# Patient Record
Sex: FEMALE | Race: WHITE
[De-identification: names, ages, dates, MRNs, and addresses within clinical notes are randomized per-mention and may not be internally consistent; named-entity substitution may affect disease eponyms.]

---

## 2018-06-18 ENCOUNTER — HOSPITAL ENCOUNTER (INPATIENT)
Dept: HOSPITAL 92 - 2NO | Age: 83
LOS: 10 days | Discharge: HOME HEALTH | DRG: 871 | End: 2018-06-28
Attending: INTERNAL MEDICINE | Admitting: INTERNAL MEDICINE
Payer: MEDICARE

## 2018-06-18 VITALS — BODY MASS INDEX: 26.1 KG/M2

## 2018-06-18 DIAGNOSIS — E87.6: ICD-10-CM

## 2018-06-18 DIAGNOSIS — K51.90: ICD-10-CM

## 2018-06-18 DIAGNOSIS — E83.42: ICD-10-CM

## 2018-06-18 DIAGNOSIS — K57.30: ICD-10-CM

## 2018-06-18 DIAGNOSIS — K64.8: ICD-10-CM

## 2018-06-18 DIAGNOSIS — G47.00: ICD-10-CM

## 2018-06-18 DIAGNOSIS — R57.1: ICD-10-CM

## 2018-06-18 DIAGNOSIS — K64.4: ICD-10-CM

## 2018-06-18 DIAGNOSIS — E78.5: ICD-10-CM

## 2018-06-18 DIAGNOSIS — K55.9: ICD-10-CM

## 2018-06-18 DIAGNOSIS — A41.9: Primary | ICD-10-CM

## 2018-06-18 DIAGNOSIS — Z88.2: ICD-10-CM

## 2018-06-18 DIAGNOSIS — Z88.0: ICD-10-CM

## 2018-06-18 LAB
ALBUMIN SERPL BCG-MCNC: 3.1 G/DL (ref 3.4–4.8)
ALP SERPL-CCNC: 73 U/L (ref 40–150)
ALT SERPL W P-5'-P-CCNC: 12 U/L (ref 8–55)
ANION GAP SERPL CALC-SCNC: 13 MMOL/L (ref 10–20)
AST SERPL-CCNC: 11 U/L (ref 5–34)
BACTERIA UR QL AUTO: (no result) HPF
BILIRUB SERPL-MCNC: 0.7 MG/DL (ref 0.2–1.2)
BUN SERPL-MCNC: 11 MG/DL (ref 9.8–20.1)
CALCIUM SERPL-MCNC: 8.8 MG/DL (ref 7.8–10.44)
CHLORIDE SERPL-SCNC: 99 MMOL/L (ref 98–107)
CO2 SERPL-SCNC: 28 MMOL/L (ref 23–31)
CREAT CL PREDICTED SERPL C-G-VRATE: 67 ML/MIN (ref 70–130)
CRYSTAL-AUWI FLAG: 0.1 (ref 0–15)
GLOBULIN SER CALC-MCNC: 3.2 G/DL (ref 2.4–3.5)
GLUCOSE SERPL-MCNC: 118 MG/DL (ref 83–110)
HEV IGM SER QL: 6.3 (ref 0–7.99)
HGB BLD-MCNC: 15.6 G/DL (ref 12–16)
HYALINE CASTS #/AREA URNS LPF: (no result) LPF
MCH RBC QN AUTO: 34.4 PG (ref 27–31)
MCV RBC AUTO: 102 FL (ref 81–99)
MDIFF COMPLETE?: YES
PATHC CAST-AUWI FLAG: 0.87 (ref 0–2.49)
PLATELET # BLD AUTO: 279 THOU/UL (ref 130–400)
PLATELET BLD QL SMEAR: (no result)
POTASSIUM SERPL-SCNC: 2.6 MMOL/L (ref 3.5–5.1)
RBC # BLD AUTO: 4.54 MILL/UL (ref 4.2–5.4)
RBC UR QL AUTO: (no result) HPF (ref 0–3)
SODIUM SERPL-SCNC: 137 MMOL/L (ref 136–145)
SP GR UR STRIP: 1.02 (ref 1–1.04)
SPERM-AUWI FLAG: 0 (ref 0–9.9)
WBC # BLD AUTO: 16.4 THOU/UL (ref 4.8–10.8)
WBC UR QL AUTO: (no result) HPF (ref 0–3)
YEAST-AUWI FLAG: 0 (ref 0–25)

## 2018-06-18 PROCEDURE — 85018 HEMOGLOBIN: CPT

## 2018-06-18 PROCEDURE — 87899 AGENT NOS ASSAY W/OPTIC: CPT

## 2018-06-18 PROCEDURE — 83630 LACTOFERRIN FECAL (QUAL): CPT

## 2018-06-18 PROCEDURE — 86706 HEP B SURFACE ANTIBODY: CPT

## 2018-06-18 PROCEDURE — 86704 HEP B CORE ANTIBODY TOTAL: CPT

## 2018-06-18 PROCEDURE — C9113 INJ PANTOPRAZOLE SODIUM, VIA: HCPCS

## 2018-06-18 PROCEDURE — 80048 BASIC METABOLIC PNL TOTAL CA: CPT

## 2018-06-18 PROCEDURE — 85014 HEMATOCRIT: CPT

## 2018-06-18 PROCEDURE — 80053 COMPREHEN METABOLIC PANEL: CPT

## 2018-06-18 PROCEDURE — 83735 ASSAY OF MAGNESIUM: CPT

## 2018-06-18 PROCEDURE — 36415 COLL VENOUS BLD VENIPUNCTURE: CPT

## 2018-06-18 PROCEDURE — 87329 GIARDIA AG IA: CPT

## 2018-06-18 PROCEDURE — 87045 FECES CULTURE AEROBIC BACT: CPT

## 2018-06-18 PROCEDURE — 81015 MICROSCOPIC EXAM OF URINE: CPT

## 2018-06-18 PROCEDURE — 87046 STOOL CULTR AEROBIC BACT EA: CPT

## 2018-06-18 PROCEDURE — 87389 HIV-1 AG W/HIV-1&-2 AB AG IA: CPT

## 2018-06-18 PROCEDURE — 81001 URINALYSIS AUTO W/SCOPE: CPT

## 2018-06-18 PROCEDURE — 87081 CULTURE SCREEN ONLY: CPT

## 2018-06-18 PROCEDURE — 87324 CLOSTRIDIUM AG IA: CPT

## 2018-06-18 PROCEDURE — 86803 HEPATITIS C AB TEST: CPT

## 2018-06-18 PROCEDURE — 82274 ASSAY TEST FOR BLOOD FECAL: CPT

## 2018-06-18 PROCEDURE — 86709 HEPATITIS A IGM ANTIBODY: CPT

## 2018-06-18 PROCEDURE — 87449 NOS EACH ORGANISM AG IA: CPT

## 2018-06-18 PROCEDURE — 88305 TISSUE EXAM BY PATHOLOGIST: CPT

## 2018-06-18 PROCEDURE — 87340 HEPATITIS B SURFACE AG IA: CPT

## 2018-06-18 PROCEDURE — 85025 COMPLETE CBC W/AUTO DIFF WBC: CPT

## 2018-06-18 PROCEDURE — 86480 TB TEST CELL IMMUN MEASURE: CPT

## 2018-06-18 PROCEDURE — 81003 URINALYSIS AUTO W/O SCOPE: CPT

## 2018-06-18 PROCEDURE — A4216 STERILE WATER/SALINE, 10 ML: HCPCS

## 2018-06-18 PROCEDURE — 87328 CRYPTOSPORIDIUM AG IA: CPT

## 2018-06-18 RX ADMIN — MEROPENEM AND SODIUM CHLORIDE SCH MLS: 1 INJECTION, SOLUTION INTRAVENOUS at 21:07

## 2018-06-18 RX ADMIN — METRONIDAZOLE SCH MLS: 500 INJECTION, SOLUTION INTRAVENOUS at 21:08

## 2018-06-18 RX ADMIN — Medication SCH TAB: at 17:49

## 2018-06-18 RX ADMIN — Medication SCH ML: at 21:07

## 2018-06-18 NOTE — HP
DATE OF ADMISSION:  06/18/2018

 

PRIMARY CARE PHYSICIAN:  Dr. Anand Adkins.

 

CHIEF COMPLAINT:  Diarrhea with blood in the stools.

 

HISTORY OF PRESENT ILLNESS:  Ms. 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
story of microscopic colitis, usually under control, who presented as a direct admit from primary [facility]
e physician's office today with the above-mentioned complaints.  History is mainly obtained by the pa
tere herself and supplemented by her  present in the room.

 

Ms. Garcia reports that she was diagnosed with microscopic colitis about 20 years ago and has had 
a colonoscopy about 10 years ago.  She has followed up with a gastroenterologist in [facility].  She
 has been stable without any significant symptoms on budesonide 3 tablets every morning.  However, ab
out 1 week ago, she started to have significant amount of diarrhea with incontinence.  She was going 
multiple times of the day and it was difficult for her to control it.  It was associated with some cr
ampy abdominal pain, mainly in the lower abdomen.  She did not have any nausea and vomiting.  She did
 report feeling feverish and her highest temperature was 100.2.  It was associated with some chills. 
 She denies any nausea or vomiting.  Today morning, she actually had passed a significant amount of b
lood in place of stools.  She has noticed a small amount of blood in the stools for the last few days
, but it was only minor on wiping.  This morning, she reports that "only blood came out."  She presen
lio to her primary care physician's office where she was found to have low blood pressure.  Reportedl
y, systolic was in the 90s.  She was sent to the hospital as a direct admit for further evaluation an
d resuscitation.

 

At the time of my examination, the patient is hemodynamically stable, awake, alert, oriented x3 and i
s nontoxic and is symptom free.

 

She was treated with metronidazole as an outpatient for the last 4 days without benefit.

 

PAST MEDICAL HISTORY:  Microscopic colitis.

 

PAST SURGICAL HISTORY:

1.  Tonsillectomy.

2.  Ovarian surgery and appendectomy.

3.  Mastectomy 1971.

4.  Cholecystectomy in 1988.

5.  Left knee surgery.

6.  Cataract surgery.

7.  Left total knee replacement in 2014.

 

SOCIAL HISTORY:  She is  and lives with her .  No history of drug, tobacco or alcohol a
buse.

 

CURRENT MEDICATIONS:  Include aspirin 81 mg every night, melatonin 10 mg at bedtime, multivitamin ji
ly, Probiotic daily, vitamin C daily, omeprazole 20 mg daily, triazolam 2 tablets at bedtime, budeson
sharron 3 tablets every morning, Lipitor 1 tablet at bedtime.

 

ALLERGIES:  Multiple allergies listed as below, CLINDAMYCIN, LEVOFLOXACIN, PENICILLIN, POLYETHYLENE G
LYCOL and SULFONAMIDES.

 

FAMILY HISTORY:  Significant for heart attack in her father in his late 80s.  Otherwise, she denies a
ny family history of stroke or cancers.

 

REVIEW OF SYSTEMS:  A 12-point review of systems is done and is negative except for those mentioned i
n the history and physical.  Constitutional:  Weight loss or gain, ability to conduct usual activitie
s.  Skin:  Rash, itching.  Eyes:  Double vision, pain.  ENT/Mouth:  Nose bleeding, neck stiffness, pa
in, tenderness.  Cardiovascular:  Palpitations, dyspnea on exertion, orthopnea.  Respiratory:  Shortn
ess of breath, wheezing, cough, hemoptysis, fever or night sweats.  Gastrointestinal:  Poor appetite,
 abdominal pain, heartburn, nausea, vomiting, constipation, or diarrhea.  Genitourinary:  Urgency, fr
equency, dysuria, nocturia.  Musculoskeletal:  Pain, swelling.  Neurologic/Psychiatric:  Anxiety, dep
ression.  Allergy/Immunologic:  Skin rash, bleeding tendency.

 

LABORATORY DATA:  Labs were ordered at the time of presentation and are as follows:  CBC shows WBCs a
t 16.4 with 17% bands, hemoglobin is 15.6, hematocrit 46.5, platelet count of 79,000.  Serum chemistr
ies show glucose of 118.  Potassium is pending at this time, but her liver enzymes and rest of the [facility] chemistries are unremarkable.

 

 

PHYSICAL EXAMINATION:

VITAL SIGNS:  Upon presentation, temperature 99.2, pulse of 117, respirations 16, saturating 93% on r
oom air, blood pressure 141/82.

GENERAL:  No acute distress.  She is awake, alert, oriented x3, appears younger than her stated age, 
in no acute distress, appears nontoxic.

HEENT:  Mucous membranes are slightly dry.  No oropharyngeal exudate or erythema.  Head is normocepha
lic, atraumatic.  Pupils equal and reactive to light and accommodation.  Extraocular movement intact.


NECK:  Supple without any lymphadenopathy, JVD or bruit.

CHEST:  Clear to auscultation without any wheezing, rales or rhonchi.  Rate and rhythm is regular wit
hout any murmur, rubs or gallops.

ABDOMEN:  Soft.  No guarding, no rebound, no rigidity, no hepatosplenomegaly.  She is tender to palpa
tion below the umbilical region in both right and left lower quadrants.

EXTREMITIES:  Free of any cyanosis, clubbing or edema.

SKIN:  Shows some bruising and thin skin.  Otherwise, no rashes.

NEUROLOGIC:  Nonfocal.

PSYCHIATRIC:  She has normal affect

 

IMPRESSION AND PLAN:

1.  Bloody diarrhea, suspected infectious gastroenteritis.  The patient has been stable with regards 
to her symptoms of microscopic colitis for all these years on budesonide.  With the presence of fever
 and leukocytosis, infection is more likely.  At this time, we will start her on empiric IV antibioti
cs and IV fluids and send stool studies for ova and parasite culture, WBCs and C. diff.  We will requ
est consultation with Gastroenterology and continue her budesonide for now.  We will hold her aspirin
 for now.  Her H&H is stable at this time.  We will start her empirically on proton pump inhibitor IV
 b.i.d.

2.  Hypovolemic shock.  The patient's blood pressure is adequate at this time.  She will be started o
n normal saline and we will monitor closely.

3.  Sepsis.  The patient has elevated WBCs with left shift and subjective fever and the source of inf
ection.  She will be treated with IV fluids and IV antibiotics as above.

4.  History of microscopic colitis.  The patient will be referred to GI after discharge as desired by
 her.  Continue budesonide for now.  Outpatient colonoscopy as suggested by Gastroenterology upon madeline ramirez.

5.  Code status:  FULL CODE.  Discussed with the patient and her .

6.  Deep venous thrombosis and gastrointestinal prophylaxis.  At this time, we will avoid any pharmac
ological deep venous thrombosis prophylaxis given the gastrointestinal bleed.

7.  Add p.r.n. medication orders and supportive care.

 

DISPOSITION:  Ms. Garcia is currently being admitted to the hospital with sepsis and likely infect
ious gastroenteritis.

 

Estimated length of stay at this time is at least 2-3 midnights.  Further management will depend upon
 her clinical course.

## 2018-06-18 NOTE — CON
DATE OF CONSULTATION:  06/18/2018

 

CHIEF COMPLAINT:  Bloody diarrhea.

 

HISTORY OF PRESENT ILLNESS:  Ms. Garcia is an 83-year-old woman who presented with brown liquidy d
iarrhea 5 times a day starting on Friday, 3 days ago.  She felt very dehydrated with this.  She zaldivar
d Dr. Troncoso's office and was given metronidazole empirically.  Today, she had a liquidy bloody diarrh
ea episode and she came in.  She was direct admitted to the hospital for further care.  She has had n
o nausea or vomiting.  She complains of dull aching lower abdominal pain that last around 20 minutes 
at a time several times per day.  She did have loss of continence with the diarrhea initially.  She h
as had no chest pain or shortness of breath.  She does report a fever to 100.7 when the diarrhea firs
t started several days ago, but no ongoing fever.  She did have an episode of diarrhea in December fo
r which she took some metronidazole then.  She has been on budesonide daily for years for microscopic
 colitis.  She states her last colonoscopy was by Dr. Burnett in [place] last fall.

 

PAST MEDICAL HISTORY:  Microscopic colitis, hyperlipidemia, insomnia.

 

PAST SURGICAL HISTORY:  Tonsillectomy, cholecystectomy, ovary surgery, left mastectomy for benign dis
ease, and colonoscopy.

 

FAMILY HISTORY:  Negative for GI malignancy.  Her father had emphysema.

 

SOCIAL HISTORY:  She quit smoking 50 years ago.  She has around 3 ounces of bourbon daily.  No drugs.


 

ALLERGIES:  SULFA, CLINDAMYCIN, PENICILLIN, LEVOFLOXACIN and reported GoLYTELY.

 

REVIEW OF SYSTEMS:  Negative x10 systems reviewed except as stated in history of present illness.

 

PHYSICAL EXAMINATION:

VITAL SIGNS:  Temperature 99.2, pulse 107, blood pressure 141/82.

GENERAL:  She is in no acute distress, alert and oriented x3.

HEENT:  Eyes have no scleral icterus.  Oropharynx is clear, without lesions.

NECK:  No cervical or supraclavicular lymphadenopathy.

LUNGS:  Clear to auscultation bilaterally.

HEART:  Tachycardic S1, S2.

ABDOMEN:  Soft, mild tenderness in lower abdomen without guarding, a little bit more so tender in the
 left lower quadrant.  Bowel sounds are present.

EXTREMITIES:  No lower extremity edema.

 

LABORATORY:  White blood cell count 16.4, hemoglobin 15.6, platelets 279.  Creatinine 0.57.

 

IMPRESSION:

1.  Diarrhea for the last 4 days with bloody diarrhea this morning.  She is on budesonide chronically
 for microscopic colitis.  Infectious colitis would be the most likely source; however, she could hav
e developed an ischemic colitis related to dehydration from infectious colitis.  She has been treated
 empirically with metronidazole for the last 3-4 days and the symptoms have continued, however.  She 
does have 3 ounces of bourbon daily, but she has not had any since starting the metronidazole.

2.  History of microscopic colitis diagnosed 20 years ago.  She has been on budesonide for many years
 9 mg daily.

 

RECOMMENDATIONS:

1.  Obtain the previous colonoscopy report if she states she just had one in [place] last fall.

2.  IV fluids and antibiotics.

3.  Stool studies.

4.  I will hold off endoscopy at this point until we have the previous report.  CT will unlikely ferrer
ge management significantly now.  It might help show a pattern of colitis.  It might be suggestive of
 ischemic colitis; however, the first up release to obtain stool studies and treat her clinically.

5.  Clear liquid diet.

## 2018-06-19 LAB
ANION GAP SERPL CALC-SCNC: 11 MMOL/L (ref 10–20)
BUN SERPL-MCNC: 10 MG/DL (ref 9.8–20.1)
CALCIUM SERPL-MCNC: 7.9 MG/DL (ref 7.8–10.44)
CHLORIDE SERPL-SCNC: 106 MMOL/L (ref 98–107)
CO2 SERPL-SCNC: 24 MMOL/L (ref 23–31)
CREAT CL PREDICTED SERPL C-G-VRATE: 88 ML/MIN (ref 70–130)
GLUCOSE SERPL-MCNC: 84 MG/DL (ref 83–110)
HGB BLD-MCNC: 13.7 G/DL (ref 12–16)
MCH RBC QN AUTO: 34.4 PG (ref 27–31)
MCV RBC AUTO: 102 FL (ref 81–99)
MDIFF COMPLETE?: YES
PLATELET # BLD AUTO: 252 THOU/UL (ref 130–400)
PLATELET BLD QL SMEAR: (no result)
POTASSIUM SERPL-SCNC: 3.6 MMOL/L (ref 3.5–5.1)
RBC # BLD AUTO: 3.97 MILL/UL (ref 4.2–5.4)
SODIUM SERPL-SCNC: 137 MMOL/L (ref 136–145)
WBC # BLD AUTO: 12.7 THOU/UL (ref 4.8–10.8)

## 2018-06-19 RX ADMIN — METRONIDAZOLE SCH MLS: 500 INJECTION, SOLUTION INTRAVENOUS at 14:39

## 2018-06-19 RX ADMIN — Medication SCH ML: at 09:20

## 2018-06-19 RX ADMIN — Medication SCH: at 18:09

## 2018-06-19 RX ADMIN — MEROPENEM AND SODIUM CHLORIDE SCH MLS: 1 INJECTION, SOLUTION INTRAVENOUS at 06:32

## 2018-06-19 RX ADMIN — METRONIDAZOLE SCH MLS: 500 INJECTION, SOLUTION INTRAVENOUS at 20:45

## 2018-06-19 RX ADMIN — MEROPENEM AND SODIUM CHLORIDE SCH MLS: 1 INJECTION, SOLUTION INTRAVENOUS at 21:51

## 2018-06-19 RX ADMIN — Medication SCH ML: at 20:39

## 2018-06-19 RX ADMIN — MEROPENEM AND SODIUM CHLORIDE SCH MLS: 1 INJECTION, SOLUTION INTRAVENOUS at 14:39

## 2018-06-19 RX ADMIN — METRONIDAZOLE SCH MLS: 500 INJECTION, SOLUTION INTRAVENOUS at 06:32

## 2018-06-19 RX ADMIN — Medication SCH TAB: at 18:05

## 2018-06-19 NOTE — PRG
DATE OF SERVICE:  06/19/2018

 

SUBJECTIVE:  Ms. Garcia has left lower quadrant abdominal discomfort today.  She has had 4 bloody 
diarrhea episodes; however, her fifth stool today with liquidy brown and nonbloody.  She has had just
 some water today by mouth.

 

OBJECTIVE:

VITAL SIGNS:  Temperature 98.1, pulse 102, blood pressure 129/68.

GENERAL:  She is in no acute distress.  She is alert and oriented.

LUNGS:  Clear to auscultation bilaterally.

HEART:  Regular rate and rhythm.

ABDOMEN:  Soft, nontender, she has mild tenderness in the left lower quadrant without guarding.  Manuela
l sounds are present.

EXTREMITIES:  No lower extremity edema.

 

LABORATORY DATA:  White blood cell count 12.7, down from 16.4; hemoglobin 13.7, down from 15.6; plate
lets 252.  Creatinine 0.56.

 

IMPRESSION:  Bloody diarrhea.  This is most likely secondary to ischemic colitis versus infectious co
litis.  Stool studies are negative so far except for an elevated lactoferrin.  She now states that he
r last colonoscopy was 5 years ago rather than last December.

 

RECOMMENDATIONS:

1.  I will increase her IV fluids up to 200 mL per hour.

2.  Restart clear liquid diet.

3.  Continue antibiotics.

4.  We will eventually want to perform colonoscopy for her.  If she rapidly improved over the next co
uple of days, so we might delay this for a month; however, she fails to improve, then we can perform 
this as an inpatient.  She was quite dehydrated on presentation.  Her hemoglobin has improved with fl
uids.  Her hemoglobin has come down with fluid resuscitation.

## 2018-06-19 NOTE — PDOC.PN
- Subjective


Encounter Start Date: 06/19/18


Encounter Start Time: 12:20


Subjective: Patient reports some improvement in abdominal pain this morning and 

less 


-: diarrhea. Still some blood in the diarrhea. No fever or other complaints.





- Objective


Resuscitation Status: 


 











Resuscitation Status           FULL:Full Resuscitation














MAR Reviewed: Yes


Vital Signs & Weight: 


 Vital Signs (12 hours)











  Temp Pulse Resp BP Pulse Ox


 


 06/19/18 08:00  99.2 F  101 H  20  133/66  95


 


 06/19/18 04:00  99.0 F  100  17  129/65  94 L








 Weight











Admit Weight                   162 lb


 


Weight                         162 lb














I&O: 


 











 06/18/18 06/19/18 06/20/18





 06:59 06:59 06:59


 


Intake Total  1890 


 


Output Total  700 


 


Balance  1190 











Result Diagrams: 


 06/19/18 04:25





 06/19/18 04:25





Phys Exam





- Physical Examination


Constitutional: NAD


HEENT: moist MMs


Respiratory: no wheezing, no rales, no rhonchi


Cardiovascular: RRR, no significant murmur


Gastrointestinal: soft, positive bowel sounds


Mild TTP entire lower abdomen without guarding


Neurological: non-focal, moves all 4 limbs


Psychiatric: normal affect, A&O x 3





Dx/Plan


(1) Sepsis


Code(s): A41.9 - SEPSIS, UNSPECIFIED ORGANISM   Status: Acute   Comment: WBC 

decreasing, tachycardia improved   





(2) Infectious colitis


Code(s): A09 - INFECTIOUS GASTROENTERITIS AND COLITIS, UNSPECIFIED   Status: 

Acute   Comment: On Meropenem and Flagyl. Stool studies positive for blood and 

elevated lactoferrin. C.diff, Enterohemorrhagic E.coli, Shiga toxin, Campy, O&P 

all negative. Culture with normal stool austen.   





(3) GI bleed


Code(s): K92.2 - GASTROINTESTINAL HEMORRHAGE, UNSPECIFIED   Status: Acute   

Comment: secondary to #1, blood counts stable   





(4) Microscopic colitis


Code(s): K52.839 - MICROSCOPIC COLITIS, UNSPECIFIED   Status: Chronic   Comment

: controlled on Budesonide   





- Plan


cont current plan of care, continue antibiotics, PT/OT, DVT proph w/SCDs





* .








- Discharge Day


Encounter end time: 12:45

## 2018-06-20 LAB — HGB BLD-MCNC: 13.9 G/DL (ref 12–16)

## 2018-06-20 RX ADMIN — MEROPENEM AND SODIUM CHLORIDE SCH MLS: 1 INJECTION, SOLUTION INTRAVENOUS at 05:12

## 2018-06-20 RX ADMIN — METRONIDAZOLE SCH MLS: 500 INJECTION, SOLUTION INTRAVENOUS at 06:20

## 2018-06-20 RX ADMIN — MEROPENEM AND SODIUM CHLORIDE SCH MLS: 1 INJECTION, SOLUTION INTRAVENOUS at 16:47

## 2018-06-20 RX ADMIN — Medication SCH: at 18:30

## 2018-06-20 RX ADMIN — METRONIDAZOLE SCH MLS: 500 INJECTION, SOLUTION INTRAVENOUS at 21:19

## 2018-06-20 RX ADMIN — Medication SCH ML: at 20:18

## 2018-06-20 RX ADMIN — MEROPENEM AND SODIUM CHLORIDE SCH MLS: 1 INJECTION, SOLUTION INTRAVENOUS at 21:18

## 2018-06-20 RX ADMIN — Medication SCH ML: at 09:40

## 2018-06-20 RX ADMIN — METRONIDAZOLE SCH MLS: 500 INJECTION, SOLUTION INTRAVENOUS at 14:43

## 2018-06-20 NOTE — PRG
DATE OF SERVICE:  06/20/2018

 

SUBJECTIVE:  Ms. Garcia states that she was on multiple times last night with bloody diarrhea.  Sh
hudson has had less diarrhea today, but still had some blood when she wipes.  She has improvement in her l
ower abdominal pain.

 

OBJECTIVE

VITAL SIGNS:  Maximum temperature 100.0, current temperature 98.9, pulse 100, blood pressure 127/71.

GENERAL:  She is in no acute distress, awake and alert.

LUNGS:  Clear to auscultation bilaterally.

HEART:  Regular rate and rhythm.

ABDOMEN:  Soft, nontender, nondistended, bowel sounds are present.

EXTREMITIES:  No lower extremity edema.

 

LABORATORY DATA:  Hemoglobin remains stable at 13.9.

 

IMPRESSION:  Bloody diarrhea.  This is most likely secondary to ischemic colitis.  Stool studies are 
negative for pathogens.  Given the ongoing diarrhea and bleeding and that her last colonoscopy was at
 least 5 years ago, we will plan to follow through with colonoscopy tomorrow to verify the diagnosis.


 

RECOMMENDATIONS:

1.  Colonoscopy tomorrow.

2.  Continue IV fluids and antibiotics.

 

It is noted the patient reports on her allergy list an allergy to POLYETHYLENE GLYCOL.  In discussion
 with her last time she took the bowel prep, she felt like she almost fainted.  This is unlikely a tr
ue allergy to the POLYETHYLENE GLYCOL.  She more likely had some dehydration or vagal reaction relate
d to the bowel prep.  We will follow through with writing for the POLYETHYLENE GLYCOL prep for a colo
noscopy for tomorrow.

## 2018-06-20 NOTE — PDOC.PN
- Subjective


Encounter Start Date: 06/20/18


Encounter Start Time: 11:50


Subjective: Patient with 15 bloody stools overnight. Feels a bit weak but still


-: ambulating well to the bathroom. Abdominal pain not bad, seems to be


-: improving.





- Objective


Resuscitation Status: 


 











Resuscitation Status           FULL:Full Resuscitation














MAR Reviewed: Yes


Vital Signs & Weight: 


 Vital Signs (12 hours)











  Temp Pulse Resp BP Pulse Ox


 


 06/20/18 07:56  98.9 F  100  18  127/71  95








 Weight











Admit Weight                   162 lb


 


Weight                         162 lb














I&O: 


 











 06/19/18 06/20/18 06/21/18





 06:59 06:59 06:59


 


Intake Total 1890 345 


 


Output Total 700  


 


Balance 1190 345 











Result Diagrams: 


 06/19/18 04:25





 06/19/18 04:25





Phys Exam





- Physical Examination


Constitutional: NAD


HEENT: moist MMs


Respiratory: no wheezing, no rales, no rhonchi


Cardiovascular: RRR, no significant murmur


Gastrointestinal: soft, positive bowel sounds


Neurological: non-focal, moves all 4 limbs


Psychiatric: normal affect, A&O x 3





Dx/Plan


(1) Sepsis


Code(s): A41.9 - SEPSIS, UNSPECIFIED ORGANISM   Status: Acute   Comment: WBC 

decreasing, tachycardia improved though still around 100 this AM   





(2) Infectious colitis


Code(s): A09 - INFECTIOUS GASTROENTERITIS AND COLITIS, UNSPECIFIED   Status: 

Acute   Comment: On Meropenem and Flagyl. Stool studies positive for blood and 

elevated lactoferrin. C.diff, Enterohemorrhagic E.coli, Shiga toxin, Campy, O&P 

all negative. Culture with normal stool austen.   





(3) GI bleed


Code(s): K92.2 - GASTROINTESTINAL HEMORRHAGE, UNSPECIFIED   Status: Acute   

Comment: secondary to #1, recheck H/H to make sure blood counts remaining 

stable   





(4) Microscopic colitis


Code(s): K52.839 - MICROSCOPIC COLITIS, UNSPECIFIED   Status: Chronic   Comment

: controlled on Budesonide   





- Plan


cont current plan of care, continue antibiotics, PT/OT, DVT proph w/SCDs


Continue IV abx


-: If doesn't improve may need colonscopy in hospital





* .








- Discharge Day


Encounter end time: 12:00

## 2018-06-21 LAB
ANION GAP SERPL CALC-SCNC: 18 MMOL/L (ref 10–20)
BUN SERPL-MCNC: (no result) MG/DL (ref 9.8–20.1)
CALCIUM SERPL-MCNC: 7.6 MG/DL (ref 7.8–10.44)
CHLORIDE SERPL-SCNC: 105 MMOL/L (ref 98–107)
CO2 SERPL-SCNC: 17 MMOL/L (ref 23–31)
CREAT CL PREDICTED SERPL C-G-VRATE: 85 ML/MIN (ref 70–130)
CRYSTAL-AUWI FLAG: 0 (ref 0–15)
GLUCOSE SERPL-MCNC: 95 MG/DL (ref 83–110)
HEV IGM SER QL: 5.8 (ref 0–7.99)
HGB BLD-MCNC: 13.9 G/DL (ref 12–16)
HYALINE CASTS #/AREA URNS LPF: (no result) LPF
MCH RBC QN AUTO: 34.4 PG (ref 27–31)
MCV RBC AUTO: 102 FL (ref 78–98)
MDIFF COMPLETE?: YES
PATHC CAST-AUWI FLAG: 1.59 (ref 0–2.49)
PLATELET # BLD AUTO: 259 THOU/UL (ref 130–400)
POTASSIUM SERPL-SCNC: 2.6 MMOL/L (ref 3.5–5.1)
POTASSIUM SERPL-SCNC: 3.1 MMOL/L (ref 3.5–5.1)
PROT UR STRIP.AUTO-MCNC: 30 MG/DL
RBC # BLD AUTO: 4.04 MILL/UL (ref 4.2–5.4)
SODIUM SERPL-SCNC: 137 MMOL/L (ref 136–145)
SP GR UR STRIP: 1.02 (ref 1–1.04)
SPERM-AUWI FLAG: 0 (ref 0–9.9)
WBC # BLD AUTO: 10.9 THOU/UL (ref 4.8–10.8)
WBC UR QL AUTO: (no result) HPF (ref 0–3)
YEAST-AUWI FLAG: 0 (ref 0–25)

## 2018-06-21 RX ADMIN — POTASSIUM CHLORIDE SCH MLS: 14.9 INJECTION, SOLUTION INTRAVENOUS at 14:26

## 2018-06-21 RX ADMIN — POTASSIUM CHLORIDE SCH MLS: 14.9 INJECTION, SOLUTION INTRAVENOUS at 09:08

## 2018-06-21 RX ADMIN — METRONIDAZOLE SCH MLS: 500 INJECTION, SOLUTION INTRAVENOUS at 05:16

## 2018-06-21 RX ADMIN — Medication SCH ML: at 20:47

## 2018-06-21 RX ADMIN — MEROPENEM AND SODIUM CHLORIDE SCH MLS: 1 INJECTION, SOLUTION INTRAVENOUS at 05:16

## 2018-06-21 RX ADMIN — Medication SCH ML: at 09:09

## 2018-06-21 NOTE — PDOC.PN
- Subjective


Encounter Start Date: 06/21/18


Encounter Start Time: 16:07


Subjective: feels much better.


-: diarrhea is imrpoving. no more bloddy stools





- Objective


Resuscitation Status: 


 











Resuscitation Status           FULL:Full Resuscitation














MAR Reviewed: Yes


Vital Signs & Weight: 


 Vital Signs (12 hours)











  Temp Pulse Resp BP Pulse Ox


 


 06/21/18 08:00  98.7 F  101 H  20   95


 


 06/21/18 07:44  98.7 F  101 H  20  129/76  95








 Weight











Admit Weight                   162 lb


 


Weight                         162 lb














I&O: 


 











 06/20/18 06/21/18 06/22/18





 06:59 06:59 06:59


 


Intake Total 345 3200 


 


Balance 345 3200 











Result Diagrams: 


 06/21/18 04:49





 06/21/18 04:49


Additional Labs: 





Microbiology





06/18/18 Unknown   Stool   C. difficile GDH Antigen & Toxins - Final


06/18/18 Unknown   Rectal   Stool Lactoferrin - Final


06/18/18 Unknown   Rectal   Stool Culture - Final


06/18/18 Unknown   Rectal   Escherichia coli 0157 Culture - Final


06/18/18 Unknown   Rectal   Rapid Parasite Screen - Final


06/18/18 Unknown   Rectal   Campylobacter Antigen Assay - Final


06/18/18 Unknown   Rectal   Shiga Toxin Test - Final


06/18/18 18:50   Stool   Stool Occult Blood (KAY) - Final





labs reviewed





Phys Exam





- Physical Examination


Constitutional: NAD


HEENT: PERRLA, moist MMs, sclera anicteric, oral pharynx no lesions


Neck: no nodes, no JVD, supple, full ROM


Respiratory: no wheezing, no rales, no rhonchi, clear to auscultation bilateral


Cardiovascular: RRR, no significant murmur


Gastrointestinal: soft, non-tender, no distention, positive bowel sounds


Musculoskeletal: no edema, pulses present


Neurological: non-focal, normal sensation, moves all 4 limbs


Psychiatric: normal affect, A&O x 3


Skin: no rash





Dx/Plan


(1) Ischemic colitis


Code(s): K55.9 - VASCULAR DISORDER OF INTESTINE, UNSPECIFIED   Status: Acute   





(2) Hypokalemia


Code(s): E87.6 - HYPOKALEMIA   Status: Acute   





(3) Hypomagnesemia


Code(s): E83.42 - HYPOMAGNESEMIA   Status: Acute   





(4) Bloody diarrhea


Code(s): R19.7 - DIARRHEA, UNSPECIFIED   Status: Resolved   





(5) Microscopic colitis


Code(s): K52.839 - MICROSCOPIC COLITIS, UNSPECIFIED   Status: Chronic   Comment

: controlled on Budesonide   





- Plan


plan discussed w/ family, DVT proph w/SCDs


replace an drecheck Potassium and Mag.


-: check UA as first sample was contaimnated


-: ABx stopped as stool studies all Neg for infectious etiology


-: hemodynamically stable.


-: Christian SANCHEZ in am if no ore bloddy BM and stable.





* .








Review of Systems





- Review of Systems


Constitutional: weakness.  negative: fever, chills, sweats, malaise, other


ENT: negative: Ear Pain, Ear Discharge, Nose Pain, Nose Discharge, Nose 

Congestion, Mouth Pain, Mouth Swelling, Throat Pain, Throat Swelling, Other


Respiratory: negative: Cough, Dry, Shortness of Breath, Hemoptysis, SOB with 

Excertion, Pleuritic Pain, Sputum, Wheezing


Cardiovascular: negative: chest pain, palpitations, orthopnea, paroxysmal 

nocturnal dyspnea, edema, light headedness, other


Gastrointestinal: negative: Nausea, Vomiting, Abdominal Pain, Diarrhea, 

Constipation, Melena, Hematochezia, Other


Genitourinary: negative: Dysuria, Frequency, Incontinence, Hematuria, Retention

, Other


Musculoskeletal: negative: Neck Pain, Shoulder Pain, Arm Pain, Back Pain, Hand 

Pain, Leg Pain, Foot Pain, Other


Neurological: negative: Weakness, Numbness, Incoordination, Change in Speech, 

Confusion, Seizures, Other





- Medications/Allergies


Allergies/Adverse Reactions: 


 Allergies











Allergy/AdvReac Type Severity Reaction Status Date / Time


 


clindamycin [From Cleocin] Allergy   Verified 06/18/18 13:44


 


levofloxacin Allergy   Verified 06/18/18 13:44


 


Penicillins Allergy   Verified 06/18/18 13:44


 


polyethylene glycol Allergy   Verified 06/18/18 13:44





[From Colyte]     


 


polyethylene glycol 3350 Allergy   Verified 06/18/18 13:44





[From Colyte]     


 


potassium chloride Allergy   Verified 06/18/18 13:44





[From Colyte]     


 


sodium [From Colyte] Allergy   Verified 06/18/18 13:44


 


sodium bicarbonate Allergy   Verified 06/18/18 13:44





[From Colyte]     


 


sodium chloride [From Colyte] Allergy   Verified 06/18/18 13:44


 


sodium sulfate [From Colyte] Allergy   Verified 06/18/18 13:44


 


Sulfa (Sulfonamide Allergy   Verified 06/18/18 13:44





Antibiotics)     











Medications: 


 Current Medications





Acetaminophen (Tylenol)  650 mg PO Q4H PRN


   PRN Reason: Headache/Fever or Pain


   Last Admin: 06/19/18 20:38 Dose:  650 mg


Al Hydroxide/Mg Hydroxide (Maalox)  30 ml PO Q6H PRN


   PRN Reason: Heartburn  or Indigestion


Benzonatate (Tessalon)  100 mg PO Q4H PRN


   PRN Reason: Cough


Budesonide (Entocort Ec)  9 mg PO QAM [facility]


   Last Admin: 06/21/18 09:09 Dose:  9 mg


Calcium Carbonate (Tums)  1,000 mg PO Q4H PRN


   PRN Reason: Heartburn  or Indigestion


Calcium/Vitamin D (Caltrate 600 + Vit D)  1 tab PO 1800 [facility]


   Last Admin: 06/20/18 18:30 Dose:  Not Given


Clonidine (Catapres)  0.1 mg PO Q4H PRN


   PRN Reason: Systolic BP > 150


Guaifenesin (Robitussin Sf)  200 mg PO Q4H PRN


   PRN Reason: Cough


Hydralazine HCl (Apresoline)  10 mg SLOW IVP Q4H PRN


   PRN Reason: Systolic BP > 170


Sodium Chloride (Normal Saline 0.9%)  1,000 mls @ 50 mls/hr IV .Q20H [facility]


   Last Admin: 06/21/18 11:24 Dose:  1,000 mls


Magnesium Sulfate 1 gm/ Sodium (Chloride)  102 mls @ 100 mls/hr IVPB ONE [facility]


Loratadine (Claritin)  10 mg PO DAILYPRN PRN


   PRN Reason: Sinus Symptoms


   Last Admin: 06/21/18 02:30 Dose:  10 mg


Melatonin (Melatonin)  9 mg PO HS [facility]


   Last Admin: 06/20/18 21:30 Dose:  9 mg


Nitroglycerin (Nitrostat)  0.4 mg SL Q5MIN PRN


   PRN Reason: Chest Pain


Ondansetron HCl (Zofran)  4 mg IVP Q6H PRN


   PRN Reason: Nausea/Vomiting


   Last Admin: 06/21/18 00:59 Dose:  4 mg


Pantoprazole Sodium (Protonix)  40 mg IVP Q12HR [facility]


   Last Admin: 06/21/18 09:08 Dose:  40 mg


Triazolam [Triazolam (] 2 Tab)  2 each PO HS ER[facility]


Saccharomyces Boulardii (Florastor)  250 mg PO DAILY [facility]


   Last Admin: 06/21/18 09:09 Dose:  250 mg


Sodium Chloride (Flush - Normal Saline)  10 ml IVF Q12HR [facility]


   Last Admin: 06/21/18 09:09 Dose:  10 ml


Sodium Chloride (Flush - Normal Saline)  10 ml IVF PRN PRN


   PRN Reason: Saline Flush


Tramadol HCl (Ultram)  50 mg PO Q4H PRN


   PRN Reason: Moderate Pain (4-6)


   Last Admin: 06/21/18 13:16 Dose:  50 mg

## 2018-06-21 NOTE — PRG
DATE OF SERVICE:  06/21/2018

 

SUBJECTIVE:  Mrs. Garcia is feeling much better this morning, subjectively rated as 75% improved. 
 She no longer has abdominal pain.  She still has frequent loose bowels, but with hardly any blood.  
There is no nausea or vomiting.  The patient could not tolerate a bowel prep last night for colonosco
py. 

 

PHYSICAL EXAMINATION:

VITAL SIGNS:  Temperature is 98.7, blood pressure 129/76, pulse of 101.

GENERAL:  She is alert, conversant, in no distress.

HEENT:  Shows anicteric sclerae.

CARDIOVASCULAR:  Shows normal S1, S2 regular rate and rhythm.

CHEST:  Shows normal breath sounds.

ABDOMEN:  Soft.  Mildly protuberant, but nontender.  No distention, no palpable mass or organomegaly.
  Good bowel sounds.

EXTREMITIES:  Showed trace pretibial edema.

 

LABORATORY DATA:  WBC is 10.9, hemoglobin 13.9, platelet count of 259.  Sodium 137, potassium 2.6, ch
loride 105, CO2 17, creatinine 0.58, BUN of 4.

 

Stool studies all negative.

 

ASSESSMENT:

1.  Bloody diarrhea associated abdominal pain on admission, clinically much improved.  This is most l
ikely from resolving ischemic colitis.  According to patient and , she had a normal colonoscop
y within the last 5 years in [place].  The patient did not tolerate bowel prep for today's colonoscopy. 
 However, I do not see any urgent need for a colonoscopy at this point given her clinical improvement
. 

2.  Hypokalemia, being replaced.

3.  History of chronic microscopic colitis on budesonide.

 

RECOMMENDATIONS:

1.  Advance diet and increase activity and ambulation.

2.  Continue budesonide 9 mg p.o. q.a.m.

3.  Discontinue meropenem and metronidazole.

4.  The patient can be discharged tomorrow if she continues clinical improvement and tolerates diet.

## 2018-06-22 LAB
ANION GAP SERPL CALC-SCNC: 14 MMOL/L (ref 10–20)
BUN SERPL-MCNC: (no result) MG/DL (ref 9.8–20.1)
CALCIUM SERPL-MCNC: 7.5 MG/DL (ref 7.8–10.44)
CHLORIDE SERPL-SCNC: 105 MMOL/L (ref 98–107)
CO2 SERPL-SCNC: 19 MMOL/L (ref 23–31)
CREAT CL PREDICTED SERPL C-G-VRATE: 85 ML/MIN (ref 70–130)
GLUCOSE SERPL-MCNC: 108 MG/DL (ref 83–110)
HGB BLD-MCNC: 13.2 G/DL (ref 12–16)
MAGNESIUM SERPL-MCNC: 1.8 MG/DL (ref 1.6–2.6)
MCH RBC QN AUTO: 34.1 PG (ref 27–31)
MCV RBC AUTO: 103 FL (ref 78–98)
MDIFF COMPLETE?: YES
PLATELET # BLD AUTO: 237 THOU/UL (ref 130–400)
POTASSIUM SERPL-SCNC: 2.4 MMOL/L (ref 3.5–5.1)
RBC # BLD AUTO: 3.86 MILL/UL (ref 4.2–5.4)
SODIUM SERPL-SCNC: 136 MMOL/L (ref 136–145)
WBC # BLD AUTO: 12.2 THOU/UL (ref 4.8–10.8)

## 2018-06-22 RX ADMIN — METRONIDAZOLE SCH MLS: 500 INJECTION, SOLUTION INTRAVENOUS at 15:24

## 2018-06-22 RX ADMIN — Medication SCH ML: at 20:08

## 2018-06-22 RX ADMIN — MEROPENEM AND SODIUM CHLORIDE SCH MLS: 1 INJECTION, SOLUTION INTRAVENOUS at 13:52

## 2018-06-22 RX ADMIN — MEROPENEM AND SODIUM CHLORIDE SCH MLS: 1 INJECTION, SOLUTION INTRAVENOUS at 20:08

## 2018-06-22 RX ADMIN — Medication SCH: at 10:03

## 2018-06-22 RX ADMIN — DIPHENOXYLATE HYDROCHLORIDE AND ATROPINE SULFATE PRN TAB: 2.5; .025 TABLET ORAL at 17:56

## 2018-06-22 RX ADMIN — METRONIDAZOLE SCH MLS: 500 INJECTION, SOLUTION INTRAVENOUS at 23:03

## 2018-06-22 NOTE — PRG
DATE OF SERVICE:  06/22/2018

 

SUBJECTIVE:  Ms. Garcia has had 12 bowel movements over the last 12 plus hours.  These are small v
olume up to half a cupful at a time.  They have been intermittently bloody.  However, the last bowel 
movement was not bloody.  She has had no ongoing nausea or vomiting, her appetite is limited.  She on
ly took a couple of bites of hamburger today.  She ate a fourth of grilled sandwich last night.  She 
did attempt to drink some of the GoLYTELY a couple of nights ago, but did not tolerate it and therefo
re, the colonoscopy was placed on hold indefinitely for now.

 

PHYSICAL EXAMINATION:

VITAL SIGNS:  Temperature is 98.7, pulse 100, blood pressure 134/80.

GENERAL:  She is in no acute distress.  She is alert and oriented x3.

LUNGS:  Clear to auscultation bilaterally.

HEART:  Regular rate and rhythm.

ABDOMEN:  Soft.  Mild tenderness in the lower abdomen without guarding.  Bowel sounds are present.

EXTREMITIES:  A 1-2+ pitting lower extremity edema.

 

LABORATORY DATA:  Potassium 2.4, creatinine 0.58 and white blood cell count 12.2.

 

IMPRESSION:  Ischemic colitis.  We will continue supportive care with IV fluids and antibiotics.  It 
is possible that she had an infectious colitis that then triggered ischemic colitis.  This is a clini
catrina diagnosis at this point.  She did not tolerate bowel prep for colonoscopy to confirm the diagnosi
s.  CT scan could show segmental inflammation to further support diagnosis; however, this will unlike
ly  at this point, so this has been held.  She does have a history of microscopic co
litis ;however, that would not cause the bleeding.  It certainly could contribute to the diarrhea.  I
 do know that she necessarily truly worsened last night with an increase in diarrhea over the last 12
 hours.  She just started eating solid food again last night.  It may be that she has an uptake in th
e stool output along with her food intake.

 

RECOMMENDATIONS:

1.  Continue IV fluids and antibiotics.

2.  Ultimately, if she continues to have persistent symptoms and fails to improve, then colonoscopy t
o help differentiate for obvious inflammatory colitis or ischemic colitis or microscopic colitis coul
d be performed.

3.  Dr. Mena will cover the weekend.

## 2018-06-22 NOTE — PDOC.PN
- Subjective


Encounter Start Date: 06/22/18


Encounter Start Time: 12:14


Subjective: feels poorly.reports bloody dirahhea all night & this morning


-: no N/V.mild lower abd pain





- Objective


Resuscitation Status: 


 











Resuscitation Status           FULL:Full Resuscitation














MAR Reviewed: Yes


Vital Signs & Weight: 


 Vital Signs (12 hours)











  Temp Pulse Resp BP Pulse Ox


 


 06/22/18 08:05  98.9 F  92  20  136/81  95


 


 06/22/18 08:00  98.9 F  92  20   95








 Weight











Admit Weight                   162 lb


 


Weight                         162 lb














I&O: 


 











 06/21/18 06/22/18 06/23/18





 06:59 06:59 06:59


 


Intake Total 3200 2650 


 


Output Total  1 


 


Balance 3200 2649 











Result Diagrams: 


 06/22/18 04:19





 06/22/18 04:19


Additional Labs: 





Microbiology





06/18/18 Unknown   Stool   C. difficile GDH Antigen & Toxins - Final


06/18/18 Unknown   Rectal   Stool Lactoferrin - Final


06/18/18 Unknown   Rectal   Stool Culture - Final


06/18/18 Unknown   Rectal   Escherichia coli 0157 Culture - Final


06/18/18 Unknown   Rectal   Rapid Parasite Screen - Final


06/18/18 Unknown   Rectal   Campylobacter Antigen Assay - Final


06/18/18 Unknown   Rectal   Shiga Toxin Test - Final


06/18/18 18:50   Stool   Stool Occult Blood (KAY) - Final





labs reviewed





Phys Exam





- Physical Examination


Constitutional: NAD


looks tired


HEENT: PERRLA, moist MMs, sclera anicteric, oral pharynx no lesions


Neck: no nodes, no JVD, supple, full ROM


Respiratory: no wheezing, no rales, no rhonchi, clear to auscultation bilateral


Cardiovascular: RRR, no significant murmur


Gastrointestinal: soft, no distention, positive bowel sounds


mild TTP lower abdomen


Musculoskeletal: no edema, pulses present


Neurological: non-focal, normal sensation, moves all 4 limbs


Psychiatric: normal affect, A&O x 3


Skin: no rash





Dx/Plan


(1) Ischemic colitis


Code(s): K55.9 - VASCULAR DISORDER OF INTESTINE, UNSPECIFIED   Status: Acute   





(2) Hypokalemia


Code(s): E87.6 - HYPOKALEMIA   Status: Acute   





(3) Hypomagnesemia


Code(s): E83.42 - HYPOMAGNESEMIA   Status: Acute   





(4) Bloody diarrhea


Code(s): R19.7 - DIARRHEA, UNSPECIFIED   Status: Resolved   





(5) Microscopic colitis


Code(s): K52.839 - MICROSCOPIC COLITIS, UNSPECIFIED   Status: Chronic   Comment

: controlled on Budesonide   





- Plan


plan discussed w/ family, continue antibiotics, PT/OT, DVT proph w/SCDs


WBC worse & clinically worse.will restart ABx for now.


-: able to tolrate diet .cont


-: refusing colon prep again.advised about complications from ischemic colitis


-: replace & recheck potassium.likley diarrheal losses.add daily dose


-: am labs.hemodynamically stable butnot ready for Dc yet.monitor





* .








Review of Systems





- Review of Systems


Constitutional: weakness, malaise.  negative: fever, chills, sweats, other


ENT: negative: Ear Pain, Ear Discharge, Nose Pain, Nose Discharge, Nose 

Congestion, Mouth Pain, Mouth Swelling, Throat Pain, Throat Swelling, Other


Respiratory: negative: Cough, Dry, Shortness of Breath, Hemoptysis, SOB with 

Excertion, Pleuritic Pain, Sputum, Wheezing


Cardiovascular: negative: chest pain, palpitations, orthopnea, paroxysmal 

nocturnal dyspnea, edema, light headedness, other


Gastrointestinal: Diarrhea, Hematochezia.  negative: Nausea, Vomiting, 

Abdominal Pain, Constipation, Melena, Other


Genitourinary: negative: Dysuria, Frequency, Incontinence, Hematuria, Retention

, Other


Musculoskeletal: negative: Neck Pain, Shoulder Pain, Arm Pain, Back Pain, Hand 

Pain, Leg Pain, Foot Pain, Other


Skin: negative: Rash, Lesions, Sorin, Bruising, Other


Neurological: negative: Weakness, Numbness, Incoordination, Change in Speech, 

Confusion, Seizures, Other





- Medications/Allergies


Allergies/Adverse Reactions: 


 Allergies











Allergy/AdvReac Type Severity Reaction Status Date / Time


 


clindamycin [From Cleocin] Allergy   Verified 06/18/18 13:44


 


levofloxacin Allergy   Verified 06/18/18 13:44


 


Penicillins Allergy   Verified 06/18/18 13:44


 


polyethylene glycol Allergy   Verified 06/18/18 13:44





[From Colyte]     


 


polyethylene glycol 3350 Allergy   Verified 06/18/18 13:44





[From Colyte]     


 


potassium chloride Allergy   Verified 06/18/18 13:44





[From Colyte]     


 


sodium [From Colyte] Allergy   Verified 06/18/18 13:44


 


sodium bicarbonate Allergy   Verified 06/18/18 13:44





[From Colyte]     


 


sodium chloride [From Colyte] Allergy   Verified 06/18/18 13:44


 


sodium sulfate [From Colyte] Allergy   Verified 06/18/18 13:44


 


Sulfa (Sulfonamide Allergy   Verified 06/18/18 13:44





Antibiotics)     











Medications: 


 Current Medications





Acetaminophen (Tylenol)  650 mg PO Q4H PRN


   PRN Reason: Headache/Fever or Pain


   Last Admin: 06/19/18 20:38 Dose:  650 mg


Al Hydroxide/Mg Hydroxide (Maalox)  30 ml PO Q6H PRN


   PRN Reason: Heartburn  or Indigestion


Benzonatate (Tessalon)  100 mg PO Q4H PRN


   PRN Reason: Cough


[facility] Ec)  [facility]


   Last Admin: 06/22/18 10:01 Dose:  9 mg


Calcium Carbonate (Tums)  1,000 mg PO Q4H PRN


   PRN Reason: Heartburn  or Indigestion


Clonidine (Catapres)  0.1 mg PO Q4H PRN


   PRN Reason: Systolic BP > 150


Guaifenesin (Robitussin Sf)  200 mg PO Q4H PRN


   PRN Reason: Cough


Hydralazine HCl (Apresoline)  10 mg SLOW IVP Q4H PRN


   PRN Reason: Systolic BP > 170


Sodium Chloride (Normal Saline 0.9%)  1,000 mls @ 50 mls/hr IV .Q20H [facility]


   Last Admin: 06/21/18 22:06 Dose:  1,000 mls


Metronidazole 500 mg/ Device  100 mls @ 100 mls/hr IVPB Q8HR [facility]


Meropenem 1 gm/ Device  50 mls @ 100 mls/hr IVPB 0500,1300,2100 [facility]


Loratadine (Claritin)  10 mg PO DAILYPRN PRN


   PRN Reason: Sinus Symptoms


   Last Admin: 06/21/18 02:30 Dose:  10 mg


Melatonin (Melatonin)  9 mg PO HS [facility]


   Last Admin: 06/21/18 22:04 Dose:  9 mg


Nitroglycerin (Nitrostat)  0.4 mg SL Q5MIN PRN


   PRN Reason: Chest Pain


Ondansetron HCl (Zofran)  4 mg IVP Q6H PRN


   PRN Reason: Nausea/Vomiting


   Last Admin: 06/21/18 00:59 Dose:  4 mg


Pantoprazole Sodium (Protonix)  40 mg IVP Q12HR [facility]


   Last Admin: 06/22/18 10:02 Dose:  40 mg


Triazolam [Triazolam (] 2 Tab)  2 each PO HS [facility]


Potassium Chloride (K-Dur)  40 meq PO QAM-[facility]


Saccharomyces Boulardii (Florastor)  250 mg PO DAILY [facility]


   Last Admin: 06/22/18 10:02 Dose:  250 mg


Sodium Chloride (Flush - Normal Saline)  10 ml IVF Q12HR [facility]


   Last Admin: 06/22/18 10:03 Dose:  Not Given


Sodium Chloride (Flush - Normal Saline)  10 ml IVF PRN PRN


   PRN Reason: Saline Flush


Tramadol HCl (Ultram)  50 mg PO Q4H PRN


   PRN Reason: Moderate Pain (4-6)


   Last Admin: 06/21/18 13:16 Dose:  50 mg

## 2018-06-23 LAB
ANION GAP SERPL CALC-SCNC: 11 MMOL/L (ref 10–20)
BUN SERPL-MCNC: (no result) MG/DL (ref 9.8–20.1)
CALCIUM SERPL-MCNC: 7.6 MG/DL (ref 7.8–10.44)
CHLORIDE SERPL-SCNC: 107 MMOL/L (ref 98–107)
CO2 SERPL-SCNC: 22 MMOL/L (ref 23–31)
CREAT CL PREDICTED SERPL C-G-VRATE: 101 ML/MIN (ref 70–130)
GLUCOSE SERPL-MCNC: 109 MG/DL (ref 83–110)
HGB BLD-MCNC: 13 G/DL (ref 12–16)
MCH RBC QN AUTO: 33.9 PG (ref 27–31)
MCV RBC AUTO: 100 FL (ref 78–98)
MDIFF COMPLETE?: YES
PLATELET # BLD AUTO: 253 THOU/UL (ref 130–400)
POTASSIUM SERPL-SCNC: 3.3 MMOL/L (ref 3.5–5.1)
RBC # BLD AUTO: 3.84 MILL/UL (ref 4.2–5.4)
SODIUM SERPL-SCNC: 137 MMOL/L (ref 136–145)
WBC # BLD AUTO: 10.4 THOU/UL (ref 4.8–10.8)

## 2018-06-23 RX ADMIN — METRONIDAZOLE SCH MLS: 500 INJECTION, SOLUTION INTRAVENOUS at 14:28

## 2018-06-23 RX ADMIN — MEROPENEM AND SODIUM CHLORIDE SCH MLS: 1 INJECTION, SOLUTION INTRAVENOUS at 20:27

## 2018-06-23 RX ADMIN — MEROPENEM AND SODIUM CHLORIDE SCH MLS: 1 INJECTION, SOLUTION INTRAVENOUS at 05:30

## 2018-06-23 RX ADMIN — DIPHENOXYLATE HYDROCHLORIDE AND ATROPINE SULFATE PRN TAB: 2.5; .025 TABLET ORAL at 22:19

## 2018-06-23 RX ADMIN — Medication SCH ML: at 20:28

## 2018-06-23 RX ADMIN — METRONIDAZOLE SCH MLS: 500 INJECTION, SOLUTION INTRAVENOUS at 06:30

## 2018-06-23 RX ADMIN — MEROPENEM AND SODIUM CHLORIDE SCH MLS: 1 INJECTION, SOLUTION INTRAVENOUS at 12:40

## 2018-06-23 RX ADMIN — METRONIDAZOLE SCH MLS: 500 INJECTION, SOLUTION INTRAVENOUS at 22:20

## 2018-06-23 RX ADMIN — DIPHENOXYLATE HYDROCHLORIDE AND ATROPINE SULFATE PRN TAB: 2.5; .025 TABLET ORAL at 08:45

## 2018-06-23 RX ADMIN — Medication SCH ML: at 08:47

## 2018-06-23 NOTE — PRG
DATE OF SERVICE:  06/23/2018

 

HOSPITAL VISIT NOTE

 

SUBJECTIVE:  Ms. Elina Garcia is a very pleasant 83-year-old  female hospitalized with a
bdominal cramping, hematochezia and diarrhea.  The patient has a history of microscopic colitis diagn
osed early.  She takes budesonide 3 mg tablet three times a day.  The patient had a bad night last ni
ght, she had liquid stools and also bloody diarrhea.  This morning, she is feeling better.  She has n
o abdominal pain.  No nausea, vomiting, no diarrhea.  Although she has had bloody diarrhea, her blood
 count  has actually remained stable.  The WBC count is 10,400, hemoglobin 13, hematocrit 38.6.

 

PHYSICAL EXAMINATION:

GENERAL:  She appears very comfortable.  She is awake, alert, and oriented to time, place and person.


VITAL SIGNS:  Afebrile, temperature 98.95, pulse 85, blood pressure 119/81.

CARDIOVASCULAR SYSTEM:  First and second sounds normal.

LUNGS:  Clear to auscultation.

ABDOMEN:  Soft.  Abdomen is nondistended.  Abdomen is nontender.  There is no organomegaly or mass.

 

LABORATORY DATA:  The stool studies including Clostridium difficile toxin and antigen is negative.  A
lso, the Campylobacter antigen and Shiga toxin all are negative.

 

CLINICAL IMPRESSION:

1.  Bloody diarrhea seems to be resolving and her abdomen is very benign.

2.  History of microscopic colitis.

 

PLAN:  Diet as tolerated.  We will observe for 1 more day.  If she does well without any bleeding or 
diarrhea, may consider discharge home tomorrow.

## 2018-06-24 RX ADMIN — MEROPENEM AND SODIUM CHLORIDE SCH MLS: 1 INJECTION, SOLUTION INTRAVENOUS at 20:09

## 2018-06-24 RX ADMIN — METRONIDAZOLE SCH MLS: 500 INJECTION, SOLUTION INTRAVENOUS at 05:55

## 2018-06-24 RX ADMIN — DIPHENOXYLATE HYDROCHLORIDE AND ATROPINE SULFATE PRN TAB: 2.5; .025 TABLET ORAL at 13:58

## 2018-06-24 RX ADMIN — Medication SCH ML: at 08:10

## 2018-06-24 RX ADMIN — MEROPENEM AND SODIUM CHLORIDE SCH MLS: 1 INJECTION, SOLUTION INTRAVENOUS at 12:30

## 2018-06-24 RX ADMIN — MEROPENEM AND SODIUM CHLORIDE SCH MLS: 1 INJECTION, SOLUTION INTRAVENOUS at 05:04

## 2018-06-24 RX ADMIN — Medication SCH ML: at 20:09

## 2018-06-24 NOTE — PRG
DATE OF SERVICE:  06/24/2018

 

SUBJECTIVE:  This is an 83-year-old  female with a history of microscopic colitis, admitted 
to the hospital with hematochezia and rectal bleeding.  Although she has had bleeding off and on, her
 blood count is actually pretty stable.  Over the last 4 days, her blood count is around 13.7.  She h
as had mild bleeding today x2.  As per the nurse, the bleeding is very mild.  There is no abdominal p
ain.  There is no nausea and vomiting.  She is tolerating diet.

 

OBJECTIVE:

GENERAL:  Appears comfortable.

VITAL SIGNS:  Stable, afebrile.  Pulse is 90, blood pressure 130/76.

CARDIOVASCULAR:  First and second heart sounds normal.

LUNGS:  Clear to auscultation.

ABDOMEN:  Soft.  Nontender.  No organomegaly or masses.

 

CLINICAL IMPRESSION:

1.  Rectal bleeding, diarrhea, possible infectious colitis versus ischemic colitis.

2.  History of microscopic colitis.

 

She seems to be doing better except for mild bleeding.  Her abdomen is very benign.  The patient was 
kept one more day in the hospital.  If she does well, consider discharge tomorrow.

## 2018-06-24 NOTE — PDOC.PN
- Subjective


Encounter Start Date: 06/24/18


Encounter Start Time: 13:32


Subjective: diarrhea is reolving.used lomotil 2-3 times in 48 hours.


-: still c/o small blood per rectum w/o stools. no AP


-: still w poor apetite but drank milkshake/soup last night brought by family





- Objective


Resuscitation Status: 


 











Resuscitation Status           FULL:Full Resuscitation














MAR Reviewed: Yes


Vital Signs & Weight: 


 Vital Signs (12 hours)











  Temp Pulse Resp BP Pulse Ox


 


 06/24/18 08:00  98.5 F  90  16  130/76  95








 Weight











Admit Weight                   162 lb


 


Weight                         162 lb














I&O: 


 











 06/23/18 06/24/18 06/25/18





 06:59 06:59 06:59


 


Intake Total 2000 1430 


 


Balance 2000 1430 











Result Diagrams: 


 06/26/18 04:27





 06/26/18 04:27





Phys Exam





- Physical Examination


Constitutional: NAD


HEENT: PERRLA, moist MMs, sclera anicteric, oral pharynx no lesions


Neck: no nodes, no JVD, supple, full ROM


Respiratory: no wheezing, no rales, no rhonchi, clear to auscultation bilateral


Cardiovascular: RRR, no significant murmur, no rub


Gastrointestinal: soft, non-tender, no distention, positive bowel sounds


Musculoskeletal: no edema, pulses present


Neurological: non-focal, normal sensation, moves all 4 limbs


Psychiatric: normal affect, A&O x 3


Skin: no rash





Dx/Plan


(1) Ischemic colitis


Code(s): K55.9 - VASCULAR DISORDER OF INTESTINE, UNSPECIFIED   Status: Acute   





(2) Hypokalemia


Code(s): E87.6 - HYPOKALEMIA   Status: Acute   





(3) Hypomagnesemia


Code(s): E83.42 - HYPOMAGNESEMIA   Status: Acute   





(4) Bloody diarrhea


Code(s): R19.7 - DIARRHEA, UNSPECIFIED   Status: Resolved   Comment: Likley 

infectious componenet Vs Internal hemorrhoids   





(5) Microscopic colitis


Code(s): K52.839 - MICROSCOPIC COLITIS, UNSPECIFIED   Status: Chronic   Comment

: controlled on Budesonide   





- Plan


plan discussed w/ family, continue antibiotics, out of bed/ambulate, DVT proph w

/SCDs


clinically better. No abdominal tenderness.H/H stable.


-: cont ABx for now.? IBD given h/o PBC


-: pt once agin refuses colonoscopy.wants to get doen as an OP


-: script for walker written as per request.walking independently


-: check H/H & BMP in am.DC home if stable w OP GI f/u





* .








Review of Systems





- Review of Systems


Constitutional: weakness, malaise.  negative: fever, chills, sweats, other


Respiratory: negative: Cough, Dry, Shortness of Breath, Hemoptysis, SOB with 

Excertion, Pleuritic Pain, Sputum, Wheezing


Cardiovascular: negative: chest pain, palpitations, orthopnea, paroxysmal 

nocturnal dyspnea, edema, light headedness, other


Gastrointestinal: Hematochezia.  negative: Nausea, Vomiting, Abdominal Pain, 

Diarrhea, Constipation, Melena, Other


Genitourinary: negative: Dysuria, Frequency, Incontinence, Hematuria, Retention

, Other


Musculoskeletal: negative: Neck Pain, Shoulder Pain, Arm Pain, Back Pain, Hand 

Pain, Leg Pain, Foot Pain, Other


Neurological: negative: Weakness, Numbness, Incoordination, Change in Speech, 

Confusion, Seizures, Other





- Medications/Allergies


Allergies/Adverse Reactions: 


 Allergies











Allergy/AdvReac Type Severity Reaction Status Date / Time


 


clindamycin [From Cleocin] Allergy   Verified 06/18/18 13:44


 


levofloxacin Allergy   Verified 06/18/18 13:44


 


Penicillins Allergy   Verified 06/18/18 13:44


 


polyethylene glycol Allergy   Verified 06/18/18 13:44





[From Colyte]     


 


polyethylene glycol 3350 Allergy   Verified 06/18/18 13:44





[From Colyte]     


 


potassium chloride Allergy   Verified 06/18/18 13:44





[From Colyte]     


 


sodium [From Colyte] Allergy   Verified 06/18/18 13:44


 


sodium bicarbonate Allergy   Verified 06/18/18 13:44





[From Colyte]     


 


sodium chloride [From Colyte] Allergy   Verified 06/18/18 13:44


 


sodium sulfate [From Colyte] Allergy   Verified 06/18/18 13:44


 


Sulfa (Sulfonamide Allergy   Verified 06/18/18 13:44





Antibiotics)     











Medications: 


 Current Medications





Acetaminophen (Tylenol)  650 mg PO Q4H PRN


   PRN Reason: Headache/Fever or Pain


   Last Admin: 06/22/18 17:59 Dose:  650 mg


Al Hydroxide/Mg Hydroxide (Maalox)  30 ml PO Q6H PRN


   PRN Reason: Heartburn  or Indigestion


Benzonatate (Tessalon)  100 mg PO Q4H PRN


   PRN Reason: Cough


Budesonide (Entocort Ec)  9 mg PO QAM [facility]


   Last Admin: 06/24/18 08:09 Dose:  9 mg


Calcium Carbonate (Tums)  1,000 mg PO Q4H PRN


   PRN Reason: Heartburn  or Indigestion


Clonidine (Catapres)  0.1 mg PO Q4H PRN


   PRN Reason: Systolic BP > 150


Diphenoxylate HCl/Atropine (Lomotil)  1 tab PO QIDPRN PRN


   PRN Reason: Diarrhea/Loose Stools


   Last Admin: 06/23/18 22:19 Dose:  1 tab


Guaifenesin (Robitussin Sf)  200 mg PO Q4H PRN


   PRN Reason: Cough


Hydralazine HCl (Apresoline)  10 mg SLOW IVP Q4H PRN


   PRN Reason: Systolic BP > 170


Meropenem 1 gm/ Device  50 mls @ 100 mls/hr IVPB 0500,1300,2100 [facility]


   Last Admin: 06/24/18 12:30 Dose:  50 mls


Loratadine (Claritin)  10 mg PO DAILYPRN PRN


   PRN Reason: Sinus Symptoms


   Last Admin: 06/21/18 02:30 Dose:  10 mg


Melatonin (Melatonin)  9 mg PO HS [facility]


   Last Admin: 06/23/18 22:19 Dose:  9 mg


Metronidazole (Flagyl)  500 mg PO TID [facility]


Nitroglycerin (Nitrostat)  0.4 mg SL Q5MIN PRN


   PRN Reason: Chest Pain


Ondansetron HCl (Zofran)  4 mg IVP Q6H PRN


   PRN Reason: Nausea/Vomiting


   Last Admin: 06/21/18 00:59 Dose:  4 mg


Pantoprazole Sodium (Protonix)  40 mg IVP Q12HR [facility]


   Last Admin: 06/24/18 08:10 Dose:  40 mg


Triazolam [Triazolam (] 2 Tab)  2 each PO [facility]


Potassium Chloride (K-Dur)  40 meq PO QAM-WM [facility]


   Last Admin: 06/24/18 08:09 Dose:  40 meq


Saccharomyces Boulardii (Florastor)  250 mg PO DAILY [facility]


   Last Admin: 06/24/18 08:09 Dose:  250 mg


Sodium Chloride (Flush - Normal Saline)  10 ml IVF Q12HR [facility]


   Last Admin: 06/24/18 08:10 Dose:  10 ml


Sodium Chloride (Flush - Normal Saline)  10 ml IVF PRN PRN


   PRN Reason: Saline Flush


Tramadol HCl (Ultram)  50 mg PO Q4H PRN


   PRN Reason: Moderate Pain (4-6)


   Last Admin: 06/21/18 13:16 Dose:  50 mg

## 2018-06-25 LAB
ANION GAP SERPL CALC-SCNC: 11 MMOL/L (ref 10–20)
BUN SERPL-MCNC: 4 MG/DL (ref 9.8–20.1)
CALCIUM SERPL-MCNC: 7.6 MG/DL (ref 7.8–10.44)
CHLORIDE SERPL-SCNC: 105 MMOL/L (ref 98–107)
CO2 SERPL-SCNC: 26 MMOL/L (ref 23–31)
CREAT CL PREDICTED SERPL C-G-VRATE: 101 ML/MIN (ref 70–130)
GLUCOSE SERPL-MCNC: 91 MG/DL (ref 83–110)
HGB BLD-MCNC: 13.4 G/DL (ref 12–16)
POTASSIUM SERPL-SCNC: 3.1 MMOL/L (ref 3.5–5.1)
SODIUM SERPL-SCNC: 139 MMOL/L (ref 136–145)

## 2018-06-25 RX ADMIN — POTASSIUM CHLORIDE AND SODIUM CHLORIDE SCH MLS: 450; 150 INJECTION, SOLUTION INTRAVENOUS at 13:38

## 2018-06-25 RX ADMIN — Medication SCH ML: at 21:55

## 2018-06-25 RX ADMIN — MEROPENEM AND SODIUM CHLORIDE SCH MLS: 1 INJECTION, SOLUTION INTRAVENOUS at 14:03

## 2018-06-25 RX ADMIN — MEROPENEM AND SODIUM CHLORIDE SCH MLS: 1 INJECTION, SOLUTION INTRAVENOUS at 21:54

## 2018-06-25 RX ADMIN — Medication SCH ML: at 09:16

## 2018-06-25 RX ADMIN — MEROPENEM AND SODIUM CHLORIDE SCH MLS: 1 INJECTION, SOLUTION INTRAVENOUS at 05:12

## 2018-06-25 NOTE — PRG
DATE OF SERVICE:  06/25/2018

 

SUBJECTIVE:  Ms. Garcia still reports that she is having diarrhea with loss of continence and is a
sking for Imodium for that.  She continues to pass small amounts of blood few times per day around 4 
bowel movements per day.  She states she is weak; however, her family states that she has been taking
 some increase oral intake.  She had fourth of an English muffin this morning and a glass of milk.  S
he has been ambulating with assistance.  She has required potassium daily.

 

PHYSICAL EXAMINATION:

VITAL SIGNS:  Temperature 98.5, pulse 105, blood pressure 108/78.

GENERAL:  She is in no acute distress.  She is alert and oriented x3.

LUNGS:  Clear to auscultation bilaterally.

HEART:  Tachycardic, S1 and S2.

ABDOMEN:  Soft, nontender, minimal tenderness in the lower abdomen to palpation.  There is no guardin
g.  Bowel sounds are present.

EXTREMITIES:  No lower extremity edema.

 

LABORATORY DATA:  Potassium is 3.1, creatinine 0.49, white blood cell count 10.4 on 06/23/2018, hemog
lobin 13.4.

 

IMPRESSION:

1.  Bloody diarrhea.  The volume of the diarrhea is low volume.  However, she continues to have liqui
dy stools with loss of continence for which she is asking for Imodium.

2.  She continues to have rectal bleeding multiple times per day; however, this is small volume and h
er hemoglobin remains stable.

3.  Weakness and low potassium associated with the diarrhea.

4.  Overall, I think that endoscopy is necessary at this point to help to determine diagnosis and pro
gnosis.  I am concerned that if she is discharged immediately, then she will go home and take a signi
ficant amount of Imodium just potentially and back in the hospital with same problem ongoing bleeding
 and potential for flare of ischemic colitis.  This could even just be hemorrhoidal type bleeding and
 a flare of her microscopic colitis.  This could be infectious colitis or even development of inflamm
atory bowel disease.  Endoscopy would help differentiate these things.  She has been declining the pr
ocedure after this point; however, she is willing now to follow through with colonoscopy.  She is wor
ried about tolerating the bowel prep.  At worst case , if she is unable to take the prep, then we marshal
l treat with enemas and at least get a sigmoidoscopy done.

 

RECOMMENDATIONS:

1.  We will give a bowel prep very slowly through the day today.

2.  Plan colonoscopy tomorrow.

3.  She is receiving potassium IV.

## 2018-06-25 NOTE — PDOC.PN
- Subjective


Encounter Start Date: 06/25/18


Encounter Start Time: 15:43


Subjective: reports more diarrhea and blood MA.


-: feels weak and dizzy





- Objective


Resuscitation Status: 


 











Resuscitation Status           FULL:Full Resuscitation














MAR Reviewed: Yes


Vital Signs & Weight: 


 Vital Signs (12 hours)











  Temp Pulse Resp BP Pulse Ox


 


 06/25/18 07:34  98.5 F  105 H  16  108/78  95


 


 06/25/18 07:22  98.4 F  105 H  16  








 Weight











Admit Weight                   162 lb


 


Weight                         162 lb














I&O: 


 











 06/24/18 06/25/18 06/26/18





 06:59 06:59 06:59


 


Intake Total 1430 1605 


 


Balance 1430 1605 











Result Diagrams: 


 06/26/18 04:27





 06/26/18 04:27


Additional Labs: 





labs reviewed





Phys Exam





- Physical Examination


Constitutional: NAD


HEENT: PERRLA, moist MMs, sclera anicteric, oral pharynx no lesions


Neck: no nodes, no JVD, supple, full ROM


Respiratory: no wheezing, no rales, no rhonchi, clear to auscultation bilateral


Cardiovascular: RRR, no significant murmur, no rub


Gastrointestinal: soft, non-tender, no distention, positive bowel sounds


Musculoskeletal: no edema, pulses present


Neurological: non-focal, normal sensation, moves all 4 limbs


Psychiatric: normal affect, A&O x 3


Skin: no rash





Dx/Plan


(1) Ischemic colitis


Code(s): K55.9 - VASCULAR DISORDER OF INTESTINE, UNSPECIFIED   Status: Acute   





(2) Hypokalemia


Code(s): E87.6 - HYPOKALEMIA   Status: Acute   





(3) Hypomagnesemia


Code(s): E83.42 - HYPOMAGNESEMIA   Status: Acute   





(4) Bloody diarrhea


Code(s): R19.7 - DIARRHEA, UNSPECIFIED   Status: Resolved   Comment: Likley 

infectious componenet Vs Internal hemorrhoids   





(5) Microscopic colitis


Code(s): K52.839 - MICROSCOPIC COLITIS, UNSPECIFIED   Status: Chronic   Comment

: controlled on Budesonide   





- Plan


plan discussed w/ family, continue antibiotics, PT/OT, out of bed/ambulate, DVT 

proph w/SCDs


discussed w GI .will prep for colonoscopy slowly for tomorrow,


-: cont ABx.Suspect IBD given h/o PBC


-: H/H stable.


-: replace and recheck Potassium and Magnesium


-: am labs





* .








Review of Systems





- Review of Systems


Constitutional: weakness, malaise.  negative: fever, chills, sweats, other


ENT: negative: Ear Pain, Ear Discharge, Nose Pain, Nose Discharge, Nose 

Congestion, Mouth Pain, Mouth Swelling, Throat Pain, Throat Swelling, Other


Respiratory: negative: Cough, Dry, Shortness of Breath, Hemoptysis, SOB with 

Excertion, Pleuritic Pain, Sputum, Wheezing


Cardiovascular: negative: chest pain, palpitations, orthopnea, paroxysmal 

nocturnal dyspnea, edema, light headedness, other


Gastrointestinal: Diarrhea, Hematochezia.  negative: Nausea, Vomiting, 

Abdominal Pain, Constipation, Melena, Other


Genitourinary: negative: Dysuria, Frequency, Incontinence, Hematuria, Retention

, Other


Musculoskeletal: negative: Neck Pain, Shoulder Pain, Arm Pain, Back Pain, Hand 

Pain, Leg Pain, Foot Pain, Other


Neurological: negative: Weakness, Numbness, Incoordination, Change in Speech, 

Confusion, Seizures, Other





- Medications/Allergies


Allergies/Adverse Reactions: 


 Allergies











Allergy/AdvReac Type Severity Reaction Status Date / Time


 


clindamycin [From Cleocin] Allergy   Verified 06/18/18 13:44


 


levofloxacin Allergy   Verified 06/18/18 13:44


 


Penicillins Allergy   Verified 06/18/18 13:44


 


polyethylene glycol Allergy   Verified 06/18/18 13:44





[From Colyte]     


 


polyethylene glycol 3350 Allergy   Verified 06/18/18 13:44





[From Colyte]     


 


potassium chloride Allergy   Verified 06/18/18 13:44





[From Colyte]     


 


sodium [From Colyte] Allergy   Verified 06/18/18 13:44


 


sodium bicarbonate Allergy   Verified 06/18/18 13:44





[From Colyte]     


 


sodium chloride [From Colyte] Allergy   Verified 06/18/18 13:44


 


sodium sulfate [From Colyte] Allergy   Verified 06/18/18 13:44


 


Sulfa (Sulfonamide Allergy   Verified 06/18/18 13:44





Antibiotics)     











Medications: 


 Current Medications





Acetaminophen (Tylenol)  650 mg PO Q4H PRN


   PRN Reason: Headache/Fever or Pain


   Last Admin: 06/22/18 17:59 Dose:  650 mg


Al Hydroxide/Mg Hydroxide (Maalox)  30 ml PO Q6H PRN


   PRN Reason: Heartburn  or Indigestion


Benzonatate (Tessalon)  100 mg PO Q4H PRN


   PRN Reason: Cough


Budesonide (Entocort Ec)  9 mg PO QAM [facility]


   Last Admin: 06/25/18 09:11 Dose:  9 mg


Calcium Carbonate (Tums)  1,000 mg PO Q4H PRN


   PRN Reason: Heartburn  or Indigestion


Clonidine (Catapres)  0.1 mg PO Q4H PRN


   PRN Reason: Systolic BP > 150


Diphenoxylate HCl/Atropine (Lomotil)  1 tab PO QIDPRN PRN


   PRN Reason: Diarrhea/Loose Stools


   Last Admin: 06/24/18 13:58 Dose:  1 tab


Guaifenesin (Robitussin Sf)  200 mg PO Q4H PRN


   PRN Reason: Cough


Hydralazine HCl (Apresoline)  10 mg SLOW IVP Q4H PRN


   PRN Reason: Systolic BP > 170


Meropenem 1 gm/ Device  50 mls @ 100 mls/hr IVPB 0500,1300,2100 [facility]


   Last Admin: 06/25/18 14:03 Dose:  50 mls


Potassium Chloride/Sodium Chloride (1/2 Ns W/Kcl 20 Meq)  1,000 mls @ 75 mls/hr 

IV .[id number] [facility]


   Last Admin: 06/25/18 13:38 Dose:  1,000 mls


Loratadine (Claritin)  10 mg PO DAILYPRN PRN


   PRN Reason: Sinus Symptoms


   Last Admin: 06/21/18 02:30 Dose:  10 mg


Melatonin (Melatonin)  9 mg PO [facility]


   Last Admin: 06/24/18 22:02 Dose:  9 mg


Metronidazole (Flagyl)  500 mg PO TID [facility]


   Last Admin: 06/25/18 15:30 Dose:  500 mg


Nitroglycerin (Nitrostat)  0.4 mg SL Q5MIN PRN


   PRN Reason: Chest Pain


Ondansetron HCl (Zofran)  4 mg IVP Q6H PRN


   PRN Reason: Nausea/Vomiting


   Last Admin: 06/21/18 00:59 Dose:  4 mg


Pantoprazole Sodium (Protonix)  40 mg IVP Q12HR [facility]


   Last Admin: 06/25/18 09:13 Dose:  40 mg


Triazolam [Triazolam (] 2 Tab)  2 each PO [facility]


Polyethylene Glycol/Electrolytes (Golytely)  4,000 ml PO ONE [facility]


   Stop: 06/25/18 22:00


   Last Admin: 06/25/18 13:55 Dose:  4,000 ml


Potassium Chloride (K-Dur)  40 meq PO QAM-WM [facility]


   Last Admin: 06/25/18 09:11 Dose:  40 meq


Saccharomyces Boulardii (Florastor)  250 mg PO DAILY [facility]


   Last Admin: 06/25/18 09:11 Dose:  250 mg


Sodium Chloride (Flush - Normal Saline)  10 ml IVF Q12HR [facility]


   Last Admin: 06/25/18 09:16 Dose:  10 ml


Sodium Chloride (Flush - Normal Saline)  10 ml IVF PRN PRN


   PRN Reason: Saline Flush


Tramadol HCl (Ultram)  50 mg PO Q4H PRN


   PRN Reason: Moderate Pain (4-6)


   Last Admin: 06/21/18 13:16 Dose:  50 mg

## 2018-06-26 LAB
ANION GAP SERPL CALC-SCNC: 10 MMOL/L (ref 10–20)
BUN SERPL-MCNC: 4 MG/DL (ref 9.8–20.1)
CALCIUM SERPL-MCNC: 7.4 MG/DL (ref 7.8–10.44)
CHLORIDE SERPL-SCNC: 104 MMOL/L (ref 98–107)
CO2 SERPL-SCNC: 26 MMOL/L (ref 23–31)
CREAT CL PREDICTED SERPL C-G-VRATE: 112 ML/MIN (ref 70–130)
GLUCOSE SERPL-MCNC: 94 MG/DL (ref 83–110)
HGB BLD-MCNC: 13.1 G/DL (ref 12–16)
MAGNESIUM SERPL-MCNC: 1.7 MG/DL (ref 1.6–2.6)
POTASSIUM SERPL-SCNC: 3.2 MMOL/L (ref 3.5–5.1)
SODIUM SERPL-SCNC: 137 MMOL/L (ref 136–145)

## 2018-06-26 PROCEDURE — 0DBE8ZX EXCISION OF LARGE INTESTINE, VIA NATURAL OR ARTIFICIAL OPENING ENDOSCOPIC, DIAGNOSTIC: ICD-10-PCS

## 2018-06-26 RX ADMIN — Medication SCH ML: at 20:32

## 2018-06-26 RX ADMIN — POTASSIUM CHLORIDE AND SODIUM CHLORIDE SCH MLS: 450; 150 INJECTION, SOLUTION INTRAVENOUS at 11:29

## 2018-06-26 RX ADMIN — MEROPENEM AND SODIUM CHLORIDE SCH MLS: 1 INJECTION, SOLUTION INTRAVENOUS at 20:30

## 2018-06-26 RX ADMIN — MEROPENEM AND SODIUM CHLORIDE SCH MLS: 1 INJECTION, SOLUTION INTRAVENOUS at 12:25

## 2018-06-26 RX ADMIN — MESALAMINE SCH MG: 500 CAPSULE ORAL at 20:29

## 2018-06-26 RX ADMIN — POTASSIUM CHLORIDE AND SODIUM CHLORIDE SCH: 450; 150 INJECTION, SOLUTION INTRAVENOUS at 03:40

## 2018-06-26 RX ADMIN — Medication SCH ML: at 09:37

## 2018-06-26 RX ADMIN — MEROPENEM AND SODIUM CHLORIDE SCH MLS: 1 INJECTION, SOLUTION INTRAVENOUS at 05:45

## 2018-06-26 NOTE — OP
PREOPERATIVE DIAGNOSIS:  Gastrointestinal bleed.

 

PROCEDURE:  After informed consent was obtained, the patient was placed in the left lateral decubitus
 position.  Anesthesia was administered per the Anesthesia Department.  Forward-viewing endoscope was
 inserted into the rectum after perianal inspection and rectal exam revealed external hemorrhoids and
 passed to the cecum with ease.  Cecum, ileocecal valve and appendiceal or pus were normal.  The prep
 was good.  In the right colon, some granularity was noted, but this was slightly patchy until the he
patic flexure, there from the rectum was severe circumferential diffuse colitis in the form of granul
arity, loss of vascularity.  Biopsies were taken from both the right and left colon.  Left-sided dive
rticula were noted as well.

 

ASSESSMENT:

1.  Severe colitis involving transverse, descending, sigmoid, and rectum consistent with ulcerative c
olitis, less likely Crohn's disease - status post biopsy.

2.  Left-sided diverticulosis coli.

3.  Internal and external hemorrhoids.

 

RECOMMENDATIONS:

1.  Await histopathology.

2.  Mesalamine.

3.  IV steroids.

## 2018-06-26 NOTE — PDOC.PN
- Subjective


Encounter Start Date: 06/26/18


Encounter Start Time: 16:18 (late entry)


Subjective: seen prior to Colonoscopy


-: still w AP,malaise and blood CA





- Objective


Resuscitation Status: 


 











Resuscitation Status           FULL:Full Resuscitation














MAR Reviewed: Yes


Vital Signs & Weight: 


 Vital Signs (12 hours)











  Temp Pulse Resp BP Pulse Ox


 


 06/26/18 07:44  97.5 F L  94  16  121/80  95


 


 06/26/18 07:39  98.8 F  97  20  








 Weight











Admit Weight                   162 lb


 


Weight                         162 lb














I&O: 


 











 06/25/18 06/26/18 06/27/18





 06:59 06:59 06:59


 


Intake Total 1605 3100 


 


Balance 1605 3100 











Result Diagrams: 


 06/26/18 04:27





 06/26/18 04:27


Additional Labs: 





labs reviewed





Phys Exam





- Physical Examination


Constitutional: NAD


HEENT: PERRLA, moist MMs, sclera anicteric, oral pharynx no lesions


Neck: no nodes, no JVD, supple, full ROM


Respiratory: no wheezing, no rales, no rhonchi


Cardiovascular: RRR, no significant murmur, no rub


Gastrointestinal: soft, no distention, positive bowel sounds


TTP 


Musculoskeletal: no edema, pulses present


Neurological: non-focal, normal sensation, moves all 4 limbs


Psychiatric: normal affect, A&O x 3


Skin: no rash





Dx/Plan


(1) Ischemic colitis


Code(s): K55.9 - VASCULAR DISORDER OF INTESTINE, UNSPECIFIED   Status: Acute   

Comment: Colonoscopy today   





(2) Hypokalemia


Code(s): E87.6 - HYPOKALEMIA   Status: Acute   Comment: Cont to monitor. 

replace prn   





(3) Hypomagnesemia


Code(s): E83.42 - HYPOMAGNESEMIA   Status: Acute   





(4) Bloody diarrhea


Code(s): R19.7 - DIARRHEA, UNSPECIFIED   Status: Resolved   Comment: Likley 

infectious componenet Vs Internal hemorrhoids   





(5) Microscopic colitis


Code(s): K52.839 - MICROSCOPIC COLITIS, UNSPECIFIED   Status: Chronic   Comment

: controlled on Budesonide   





- Plan


plan discussed w/ family, continue antibiotics, PT/OT, out of bed/ambulate


Cont IVF for now.Colonoscopy later-follow results


-: cont empiric ABx for possible infectious colitis


-: Supportive care.


-: am labs ,monitor lytes





* .








Review of Systems





- Review of Systems


Constitutional: weakness, malaise.  negative: fever, chills, sweats, other


Eyes: negative: Pain, Vision Change, Conjunctivae Inflammation, Eyelid 

Inflammation, Redness, Other


Respiratory: negative: Cough, Dry, Shortness of Breath, Hemoptysis, SOB with 

Excertion, Pleuritic Pain, Sputum, Wheezing


Cardiovascular: negative: chest pain, palpitations, orthopnea, paroxysmal 

nocturnal dyspnea, edema, light headedness, other


Gastrointestinal: Nausea, Abdominal Pain, Hematochezia.  negative: Vomiting, 

Diarrhea, Constipation, Melena, Other


Genitourinary: negative: Dysuria, Frequency, Incontinence, Hematuria, Retention

, Other


Musculoskeletal: negative: Neck Pain, Shoulder Pain, Arm Pain, Back Pain, Hand 

Pain, Leg Pain, Foot Pain, Other


Skin: negative: Rash, Lesions, Sorin, Bruising, Other


Neurological: negative: Weakness, Numbness, Incoordination, Change in Speech, 

Confusion, Seizures, Other





- Medications/Allergies


Allergies/Adverse Reactions: 


 Allergies











Allergy/AdvReac Type Severity Reaction Status Date / Time


 


clindamycin [From Cleocin] Allergy   Verified 06/18/18 13:44


 


levofloxacin Allergy   Verified 06/18/18 13:44


 


Penicillins Allergy   Verified 06/18/18 13:44


 


polyethylene glycol Allergy   Verified 06/18/18 13:44





[From Colyte]     


 


polyethylene glycol 3350 Allergy   Verified 06/18/18 13:44





[From Colyte]     


 


potassium chloride Allergy   Verified 06/18/18 13:44





[From Colyte]     


 


sodium [From Colyte] Allergy   Verified 06/18/18 13:44


 


sodium bicarbonate Allergy   Verified 06/18/18 13:44





[From Colyte]     


 


sodium chloride [From Colyte] Allergy   Verified 06/18/18 13:44


 


sodium sulfate [From Colyte] Allergy   Verified 06/18/18 13:44


 


Sulfa (Sulfonamide Allergy   Verified 06/18/18 13:44





Antibiotics)     











Medications: 


 Current Medications





Acetaminophen (Tylenol)  650 mg PO Q4H PRN


   PRN Reason: Headache/Fever or Pain


   Last Admin: 06/22/18 17:59 Dose:  650 mg


Al Hydroxide/Mg Hydroxide (Maalox)  30 ml PO Q6H PRN


   PRN Reason: Heartburn  or Indigestion


Benzonatate (Tessalon)  100 mg PO Q4H PRN


   PRN Reason: Cough


Budesonide (Entocort Ec)  9 mg PO QAM [facility]


   Last Admin: 06/26/18 09:36 Dose:  9 mg


Calcium Carbonate (Tums)  1,000 mg PO Q4H PRN


   PRN Reason: Heartburn  or Indigestion


Clonidine (Catapres)  0.1 mg PO Q4H PRN


   PRN Reason: Systolic BP > 150


Diphenoxylate HCl/Atropine (Lomotil)  1 tab PO QIDPRN PRN


   PRN Reason: Diarrhea/Loose Stools


   Last Admin: 06/24/18 13:58 Dose:  1 tab


Guaifenesin (Robitussin Sf)  200 mg PO Q4H PRN


   PRN Reason: Cough


Hydralazine HCl (Apresoline)  10 mg SLOW IVP Q4H PRN


   PRN Reason: Systolic BP > 170


Meropenem 1 gm/ Device  50 mls @ 100 mls/hr IVPB 0500,1300,2100 [facility]


   Last Admin: 06/26/18 12:25 Dose:  50 mls


Potassium Chloride/Sodium Chloride (1/2 Ns W/Kcl 20 Meq)  1,000 mls @ 75 mls/hr 

IV .[id number] [facility]


   Last Admin: 06/26/18 11:29 Dose:  1,000 mls


Loratadine (Claritin)  10 mg PO DAILYPRN PRN


   PRN Reason: Sinus Symptoms


   Last Admin: 06/21/18 02:30 Dose:  10 mg


Melatonin (Melatonin)  9 mg PO [facility]


   Last Admin: 06/25/18 21:54 Dose:  9 mg


Mesalamine (Pentasa)  1,000 mg PO QID [facility]


Methylprednisolone Sodium Succinate (Solu-Medrol)  20 mg IVP Q8HR [facility]


Metronidazole (Flagyl)  500 mg PO TID [facility]


   Last Admin: 06/26/18 09:36 Dose:  500 mg


Nitroglycerin (Nitrostat)  0.4 mg SL Q5MIN PRN


   PRN Reason: Chest Pain


Ondansetron HCl (Zofran)  4 mg IVP Q6H PRN


   PRN Reason: Nausea/Vomiting


   Last Admin: 06/21/18 00:59 Dose:  4 mg


Ondansetron HCl (Pacu-Zofran)  4 mg IVP ONE PRN


   PRN Reason: Nausea/Vomiting


   Stop: 06/26/18 18:06


Pantoprazole Sodium (Protonix)  40 mg IVP Q12HR [facility]


   Last Admin: 06/26/18 09:36 Dose:  40 mg


Triazolam [Triazolam (] 2 Tab)  2 each PO [facility]


Potassium Chloride (K-Dur)  40 meq PO QAM-WM [facility]


   Last Admin: 06/26/18 09:36 Dose:  40 meq


Promethazine HCl (Pacu-Phenergan)  6.25 mg SLOW IVP ONE PRN


   PRN Reason: Nausea/Vomiting


   Stop: 06/26/18 18:06


Promethazine HCl (Pacu-Phenergan)  6.25 mg IM ONE PRN


   PRN Reason: Nausea/Vomiting


   Stop: 06/26/18 18:06


Saccharomyces Boulardii (Florastor)  250 mg PO DAILY [facility]


   Last Admin: 06/26/18 09:36 Dose:  250 mg


Sodium Chloride (Flush - Normal Saline)  10 ml IVF Q12HR [facility]


   Last Admin: 06/26/18 09:37 Dose:  10 ml


Sodium Chloride (Flush - Normal Saline)  10 ml IVF PRN PRN


   PRN Reason: Saline Flush


Tramadol HCl (Ultram)  50 mg PO Q4H PRN


   PRN Reason: Moderate Pain (4-6)


   Last Admin: 06/21/18 13:16 Dose:  50 mg

## 2018-06-27 LAB
ANION GAP SERPL CALC-SCNC: 15 MMOL/L (ref 10–20)
BUN SERPL-MCNC: (no result) MG/DL (ref 9.8–20.1)
CALCIUM SERPL-MCNC: 7.5 MG/DL (ref 7.8–10.44)
CHLORIDE SERPL-SCNC: 101 MMOL/L (ref 98–107)
CO2 SERPL-SCNC: 21 MMOL/L (ref 23–31)
CREAT CL PREDICTED SERPL C-G-VRATE: 101 ML/MIN (ref 70–130)
GLUCOSE SERPL-MCNC: 115 MG/DL (ref 83–110)
HBSAG INDEX: 0.18 S/CO (ref 0–0.99)
HBV SURFACE AB SERPL IA-ACNC: 1.5 MIU/ML
HEP B CORE TOTAL INDEX: 0.21 S/CO (ref 0–0.79)
HEP C INDEX: 0.21 S/CO (ref 0–0.79)
HGB BLD-MCNC: 14.3 G/DL (ref 12–16)
HIV 1/2 INDEX: 0.06 S/CO (ref ?–1)
MAGNESIUM SERPL-MCNC: 1.8 MG/DL (ref 1.6–2.6)
MCH RBC QN AUTO: 33.5 PG (ref 27–31)
MCV RBC AUTO: 106 FL (ref 78–98)
MDIFF COMPLETE?: YES
PLATELET # BLD AUTO: 240 THOU/UL (ref 130–400)
POTASSIUM SERPL-SCNC: 4 MMOL/L (ref 3.5–5.1)
RBC # BLD AUTO: 4.28 MILL/UL (ref 4.2–5.4)
SODIUM SERPL-SCNC: 133 MMOL/L (ref 136–145)
WBC # BLD AUTO: 8.9 THOU/UL (ref 4.8–10.8)

## 2018-06-27 RX ADMIN — MEROPENEM AND SODIUM CHLORIDE SCH MLS: 1 INJECTION, SOLUTION INTRAVENOUS at 05:14

## 2018-06-27 RX ADMIN — DIPHENOXYLATE HYDROCHLORIDE AND ATROPINE SULFATE PRN TAB: 2.5; .025 TABLET ORAL at 10:52

## 2018-06-27 RX ADMIN — MESALAMINE SCH MG: 500 CAPSULE ORAL at 17:22

## 2018-06-27 RX ADMIN — MEROPENEM AND SODIUM CHLORIDE SCH MLS: 1 INJECTION, SOLUTION INTRAVENOUS at 14:11

## 2018-06-27 RX ADMIN — MESALAMINE SCH MG: 500 CAPSULE ORAL at 08:36

## 2018-06-27 RX ADMIN — MESALAMINE SCH MG: 500 CAPSULE ORAL at 14:10

## 2018-06-27 RX ADMIN — MESALAMINE SCH MG: 500 CAPSULE ORAL at 21:38

## 2018-06-27 RX ADMIN — Medication SCH ML: at 21:40

## 2018-06-27 RX ADMIN — Medication SCH ML: at 08:37

## 2018-06-27 NOTE — PDOC.PN
- Subjective


Encounter Start Date: 06/27/18


Encounter Start Time: 11:00


Subjective: still has diarrhea but better


-:  and family at bedside





- Objective


Resuscitation Status: 


 











Resuscitation Status           FULL:Full Resuscitation














MAR Reviewed: Yes


Vital Signs & Weight: 


 Vital Signs (12 hours)











  Temp Pulse Resp BP Pulse Ox


 


 06/27/18 08:36  97.7 F  104 H  16  


 


 06/27/18 07:35  97.7 F  104 H  16  135/76  95








 Weight











Admit Weight                   162 lb


 


Weight                         162 lb














I&O: 


 











 06/26/18 06/27/18 06/28/18





 06:59 06:59 06:59


 


Intake Total 3100 1390 


 


Balance 3100 1390 











Result Diagrams: 


 06/27/18 04:13





 06/27/18 04:13





Phys Exam





- Physical Examination


HEENT: PERRLA, moist MMs


Neck: no JVD, supple


Respiratory: no wheezing, no rales


Cardiovascular: RRR, no significant murmur


Gastrointestinal: soft, non-tender, no distention, positive bowel sounds


Musculoskeletal: no edema, pulses present


Neurological: non-focal, moves all 4 limbs





Dx/Plan


(1) Ulcerative colitis


Code(s): K51.90 - ULCERATIVE COLITIS, UNSPECIFIED, WITHOUT COMPLICATIONS   

Status: Suspected   


Qualifiers: 


   Ulcerative colitis location: ulcerative pancolitis   Digestive disease 

complication type: with rectal bleeding   Qualified Code(s): K51.011 - 

Ulcerative (chronic) pancolitis with rectal bleeding   





(2) Physical deconditioning


Code(s): R53.81 - OTHER MALAISE   Status: Acute   





(3) Dyslipidemia


Code(s): E78.5 - HYPERLIPIDEMIA, UNSPECIFIED   Status: Chronic   





- Plan


is on meropenem


-: solumedrol and mesalamine, await bx results


-: walking program to ambulate as tolerated


-: encourage po intake


-: GI is following in hospital, ?biological agent





* .








Review of Systems





- Medications/Allergies


Allergies/Adverse Reactions: 


 Allergies











Allergy/AdvReac Type Severity Reaction Status Date / Time


 


clindamycin [From Cleocin] Allergy   Verified 06/18/18 13:44


 


levofloxacin Allergy   Verified 06/18/18 13:44


 


Penicillins Allergy   Verified 06/18/18 13:44


 


polyethylene glycol Allergy   Verified 06/18/18 13:44





[From Colyte]     


 


polyethylene glycol 3350 Allergy   Verified 06/18/18 13:44





[From Colyte]     


 


potassium chloride Allergy   Verified 06/18/18 13:44





[From Colyte]     


 


sodium [From Colyte] Allergy   Verified 06/18/18 13:44


 


sodium bicarbonate Allergy   Verified 06/18/18 13:44





[From Colyte]     


 


sodium chloride [From Colyte] Allergy   Verified 06/18/18 13:44


 


sodium sulfate [From Colyte] Allergy   Verified 06/18/18 13:44


 


Sulfa (Sulfonamide Allergy   Verified 06/18/18 13:44





Antibiotics)     











Medications: 


 Current Medications





Acetaminophen (Tylenol)  650 mg PO Q4H PRN


   PRN Reason: Headache/Fever or Pain


   Last Admin: 06/22/18 17:59 Dose:  650 mg


Al Hydroxide/Mg Hydroxide (Maalox)  30 ml PO Q6H PRN


   PRN Reason: Heartburn  or Indigestion


Benzonatate (Tessalon)  100 mg PO Q4H PRN


   PRN Reason: Cough


Budesonide (Entocort Ec)  9 mg PO QAM [facility]


   Last Admin: 06/27/18 08:54 Dose:  9 mg


Calcium Carbonate (Tums)  1,000 mg PO Q4H PRN


   PRN Reason: Heartburn  or Indigestion


Clonidine (Catapres)  0.1 mg PO Q4H PRN


   PRN Reason: Systolic BP > 150


Diphenoxylate HCl/Atropine (Lomotil)  1 tab PO QIDPRN PRN


   PRN Reason: Diarrhea/Loose Stools


   Last Admin: 06/27/18 10:52 Dose:  1 tab


Guaifenesin (Robitussin Sf)  200 mg PO Q4H PRN


   PRN Reason: Cough


Hydralazine HCl (Apresoline)  10 mg SLOW IVP Q4H PRN


   PRN Reason: Systolic BP > 170


Meropenem 1 gm/ Device  50 mls @ 100 mls/hr IVPB 0500,1300,2100 [facility]


   Last Admin: 06/27/18 05:14 Dose:  50 mls


Loratadine (Claritin)  10 mg PO DAILYPRN PRN


   PRN Reason: Sinus Symptoms


   Last Admin: 06/21/18 02:30 Dose:  10 mg


Melatonin (Melatonin)  9 mg PO HS [facility]


   Last Admin: 06/26/18 20:30 Dose:  9 mg


Mesalamine (Pentasa)  1,000 mg PO QID [facility]


   Last Admin: 06/27/18 08:36 Dose:  1,000 mg


Methylprednisolone Sodium Succinate (Solu-Medrol)  20 mg IVP Q8HR [facility]


   Last Admin: 06/27/18 05:14 Dose:  20 mg


Nitroglycerin (Nitrostat)  0.4 mg SL Q5MIN PRN


   PRN Reason: Chest Pain


Ondansetron HCl (Zofran)  4 mg IVP Q6H PRN


   PRN Reason: Nausea/Vomiting


   Last Admin: 06/21/18 00:59 Dose:  4 mg


Pantoprazole Sodium (Protonix)  40 mg PO DAILY [facility]


Triazolam [Triazolam (] 2 Tab)  2 each PO [facility]


Potassium Chloride (K-Dur)  40 meq PO QAM-[facility]


   Last Admin: 06/27/18 08:24 Dose:  40 meq


Saccharomyces Boulardii (Florastor)  250 mg PO DAILY [facility]


   Last Admin: 06/27/18 08:25 Dose:  250 mg


Sodium Chloride (Flush - Normal Saline)  10 ml IVF Q12HR [facility]


   Last Admin: 06/27/18 08:37 Dose:  10 ml


Sodium Chloride (Flush - Normal Saline)  10 ml IVF PRN PRN


   PRN Reason: Saline Flush


Tramadol HCl (Ultram)  50 mg PO Q4H PRN


   PRN Reason: Moderate Pain (4-6)


   Last Admin: 06/21/18 13:16 Dose:  50 mg

## 2018-06-27 NOTE — PRG
DATE OF SERVICE:  06/27/2018

 

GASTROENTEROLOGY PROGRESS NOTE

 

SUBJECTIVE:  Ms. Garcia feels better today after starting IV steroids yesterday.  She has had no f
urther blood in the stool.  She has fewer bowel movements.  She has still had some episodes of diarrh
ea with loss of continence.

 

OBJECTIVE:

VITAL SIGNS:  Temperature 97.7, pulse 104, blood pressure 135/76.

GENERAL:  She is in no acute distress, alert and oriented x3.

LUNGS:  Clear to auscultation bilaterally.

HEART:  Regular rate and rhythm.

ABDOMEN:  Soft, nontender and nondistended.  Bowel sounds are present.

EXTREMITIES:  No lower extremity edema.

 

LABORATORY DATA:  White blood cell count 8.9, hemoglobin 14.3, platelets 240, creatinine 0.49.

 

IMPRESSION:

1.  Ulcerative colitis.  Colonoscopy yesterday shows evidence of severe chronic ulcerative colitis in
volving the transverse, descending, sigmoid, and rectum.  Biopsies were taken and are pending.  She w
as started on IV Solu-Medrol with improvement.  She will ultimately require chronic immunosuppressive
s to treat this.  She has been on budesonide chronically treating microscopic colitis by previous GI 
in [place].  This has been inadequate to control her symptoms.  At this point, we will probably transiti
on to prednisone tomorrow and then work on approval for Entyvio or another biologic.  In the meantime
, we will check Quantiferon and viral hepatitis B serology.

2.  We should be able to stop antibiotics at this point.

3.  She is tolerating a solid diet.

4.  Await histopathology.

## 2018-06-28 VITALS — SYSTOLIC BLOOD PRESSURE: 157 MMHG | DIASTOLIC BLOOD PRESSURE: 85 MMHG | TEMPERATURE: 98 F

## 2018-06-28 LAB
ANION GAP SERPL CALC-SCNC: 6 MMOL/L (ref 10–20)
BASOPHILS # BLD AUTO: 0 THOU/UL (ref 0–0.2)
BASOPHILS NFR BLD AUTO: 0.1 % (ref 0–1)
BUN SERPL-MCNC: 9 MG/DL (ref 9.8–20.1)
CALCIUM SERPL-MCNC: 7.6 MG/DL (ref 7.8–10.44)
CHLORIDE SERPL-SCNC: 103 MMOL/L (ref 98–107)
CO2 SERPL-SCNC: 30 MMOL/L (ref 23–31)
CREAT CL PREDICTED SERPL C-G-VRATE: 107 ML/MIN (ref 70–130)
EOSINOPHIL # BLD AUTO: 0.2 THOU/UL (ref 0–0.7)
EOSINOPHIL NFR BLD AUTO: 1.4 % (ref 0–10)
GLUCOSE SERPL-MCNC: 117 MG/DL (ref 83–110)
HGB BLD-MCNC: 12.5 G/DL (ref 12–16)
LYMPHOCYTES # BLD: 1.8 THOU/UL (ref 1.2–3.4)
LYMPHOCYTES NFR BLD AUTO: 14.5 % (ref 21–51)
MAGNESIUM SERPL-MCNC: 1.7 MG/DL (ref 1.6–2.6)
MCH RBC QN AUTO: 34.5 PG (ref 27–31)
MCV RBC AUTO: 101 FL (ref 78–98)
MONOCYTES # BLD AUTO: 1.5 THOU/UL (ref 0.11–0.59)
MONOCYTES NFR BLD AUTO: 11.8 % (ref 0–10)
NEUTROPHILS # BLD AUTO: 9.2 THOU/UL (ref 1.4–6.5)
NEUTROPHILS NFR BLD AUTO: 72.2 % (ref 42–75)
PLATELET # BLD AUTO: 160 THOU/UL (ref 130–400)
POTASSIUM SERPL-SCNC: 3.4 MMOL/L (ref 3.5–5.1)
RBC # BLD AUTO: 3.62 MILL/UL (ref 4.2–5.4)
SODIUM SERPL-SCNC: 136 MMOL/L (ref 136–145)
WBC # BLD AUTO: 12.7 THOU/UL (ref 4.8–10.8)

## 2018-06-28 RX ADMIN — DIPHENOXYLATE HYDROCHLORIDE AND ATROPINE SULFATE PRN TAB: 2.5; .025 TABLET ORAL at 11:32

## 2018-06-28 RX ADMIN — Medication SCH ML: at 08:30

## 2018-06-28 NOTE — PDOC.PN
- Subjective


Encounter Start Date: 06/28/18


Encounter Start Time: 09:00


Subjective: no diarrhea from last night, feels better


-: no nausea, is eating well per daughter at bedside





- Objective


Resuscitation Status: 


 











Resuscitation Status           FULL:Full Resuscitation














MAR Reviewed: Yes


Vital Signs & Weight: 


 Vital Signs (12 hours)











  Temp Pulse Resp BP BP Pulse Ox


 


 06/28/18 08:00  98.3 F  75  16  114/70   93 L


 


 06/28/18 00:49   91    112/78 








 Weight











Admit Weight                   162 lb


 


Weight                         162 lb














I&O: 


 











 06/27/18 06/28/18 06/29/18





 06:59 06:59 06:59


 


Intake Total 1390 1850 


 


Balance 1390 1850 











Result Diagrams: 


 06/28/18 04:15





 06/28/18 08:01





Phys Exam





- Physical Examination


HEENT: PERRLA, sclera anicteric


Neck: no JVD, supple


Respiratory: no wheezing, no rales


Cardiovascular: RRR, no significant murmur


Gastrointestinal: soft, no distention, positive bowel sounds


Musculoskeletal: no edema, pulses present


Neurological: non-focal, moves all 4 limbs





Dx/Plan


(1) Ulcerative colitis


Code(s): K51.90 - ULCERATIVE COLITIS, UNSPECIFIED, WITHOUT COMPLICATIONS   

Status: Suspected   


Qualifiers: 


   Ulcerative colitis location: ulcerative pancolitis   Digestive disease 

complication type: with rectal bleeding   Qualified Code(s): K51.011 - 

Ulcerative (chronic) pancolitis with rectal bleeding   





(2) Physical deconditioning


Code(s): R53.81 - OTHER MALAISE   Status: Acute   





(3) Dyslipidemia


Code(s): E78.5 - HYPERLIPIDEMIA, UNSPECIFIED   Status: Chronic   





- Plan


await path results


-: is on prednisone and mesalamine


-: may dc home if ok with GI


-: has been amb with family in [facility]


-: will need HH with PT on discharge





* .








Review of Systems





- Medications/Allergies


Allergies/Adverse Reactions: 


 Allergies











Allergy/AdvReac Type Severity Reaction Status Date / Time


 


clindamycin [From Cleocin] Allergy   Verified 06/18/18 13:44


 


levofloxacin Allergy   Verified 06/18/18 13:44


 


Penicillins Allergy   Verified 06/18/18 13:44


 


polyethylene glycol Allergy   Verified 06/18/18 13:44





[From Colyte]     


 


polyethylene glycol 3350 Allergy   Verified 06/18/18 13:44





[From Colyte]     


 


potassium chloride Allergy   Verified 06/18/18 13:44





[From Colyte]     


 


sodium [From Colyte] Allergy   Verified 06/18/18 13:44


 


sodium bicarbonate Allergy   Verified 06/18/18 13:44





[From Colyte]     


 


sodium chloride [From Colyte] Allergy   Verified 06/18/18 13:44


 


sodium sulfate [From Colyte] Allergy   Verified 06/18/18 13:44


 


Sulfa (Sulfonamide Allergy   Verified 06/18/18 13:44





Antibiotics)     











Medications: 


 Current Medications





Acetaminophen (Tylenol)  650 mg PO Q4H PRN


   PRN Reason: Headache/Fever or Pain


   Last Admin: 06/22/18 17:59 Dose:  650 mg


Al Hydroxide/Mg Hydroxide (Maalox)  30 ml PO Q6H PRN


   PRN Reason: Heartburn  or Indigestion


Benzonatate (Tessalon)  100 mg PO Q4H PRN


   PRN Reason: Cough


Budesonide (Entocort Ec)  9 mg PO QAM [facility]


   Last Admin: 06/28/18 08:29 Dose:  9 mg


Calcium Carbonate (Tums)  1,000 mg PO Q4H PRN


   PRN Reason: Heartburn  or Indigestion


Clonidine (Catapres)  0.1 mg PO Q4H PRN


   PRN Reason: Systolic BP > 150


Diphenoxylate HCl/Atropine (Lomotil)  1 tab PO QIDPRN PRN


   PRN Reason: Diarrhea/Loose Stools


   Last Admin: 06/27/18 10:52 Dose:  1 tab


Guaifenesin (Robitussin Sf)  200 mg PO Q4H PRN


   PRN Reason: Cough


Hydralazine HCl (Apresoline)  10 mg SLOW IVP Q4H PRN


   PRN Reason: Systolic BP > 170


Loratadine (Claritin)  10 mg PO DAILYPRN PRN


   PRN Reason: Sinus Symptoms


   Last Admin: 06/21/18 02:30 Dose:  10 mg


Melatonin (Melatonin)  9 mg PO HS [facility]


   Last Admin: 06/27/18 21:38 Dose:  9 mg


Mesalamine (Pentasa)  1,000 mg PO QID [facility]


   Last Admin: 06/28/18 08:28 Dose:  1,000 mg


Nitroglycerin (Nitrostat)  0.4 mg SL Q5MIN PRN


   PRN Reason: Chest Pain


Ondansetron HCl (Zofran)  4 mg IVP Q6H PRN


   PRN Reason: Nausea/Vomiting


   Last Admin: 06/21/18 00:59 Dose:  4 mg


Pantoprazole Sodium (Protonix)  40 mg PO DAILY [facility]


   Last Admin: 06/28/18 08:29 Dose:  40 mg


Potassium Chloride (K-Dur)  40 meq PO QAM-WM [facility]


   Last Admin: 06/28/18 08:29 Dose:  40 meq


Prednisone (Prednisone)  40 mg PO QAM[facility]


   Last Admin: 06/28/18 08:29 Dose:  40 mg


Saccharomyces Boulardii (Florastor)  250 mg PO DAILY [facility]


   Last Admin: 06/28/18 08:29 Dose:  250 mg


Sodium Chloride (Flush - Normal Saline)  10 ml IVF Q12HR [facility]


   Last Admin: 06/28/18 08:30 Dose:  10 ml


Sodium Chloride (Flush - Normal Saline)  10 ml IVF PRN PRN


   PRN Reason: Saline Flush


Tramadol HCl (Ultram)  50 mg PO Q4H PRN


   PRN Reason: Moderate Pain (4-6)


   Last Admin: 06/21/18 13:16 Dose:  50 mg

## 2018-06-29 ENCOUNTER — HOSPITAL ENCOUNTER (INPATIENT)
Dept: HOSPITAL 92 - ERS | Age: 83
LOS: 9 days | DRG: 853 | End: 2018-07-08
Attending: INTERNAL MEDICINE | Admitting: INTERNAL MEDICINE
Payer: MEDICARE

## 2018-06-29 VITALS — BODY MASS INDEX: 29.3 KG/M2

## 2018-06-29 DIAGNOSIS — Z96.652: ICD-10-CM

## 2018-06-29 DIAGNOSIS — I21.A1: ICD-10-CM

## 2018-06-29 DIAGNOSIS — Z79.899: ICD-10-CM

## 2018-06-29 DIAGNOSIS — Z66: ICD-10-CM

## 2018-06-29 DIAGNOSIS — Z90.49: ICD-10-CM

## 2018-06-29 DIAGNOSIS — Z88.1: ICD-10-CM

## 2018-06-29 DIAGNOSIS — E78.5: ICD-10-CM

## 2018-06-29 DIAGNOSIS — E87.6: ICD-10-CM

## 2018-06-29 DIAGNOSIS — E87.2: ICD-10-CM

## 2018-06-29 DIAGNOSIS — Z82.49: ICD-10-CM

## 2018-06-29 DIAGNOSIS — I26.92: ICD-10-CM

## 2018-06-29 DIAGNOSIS — K21.9: ICD-10-CM

## 2018-06-29 DIAGNOSIS — Z88.2: ICD-10-CM

## 2018-06-29 DIAGNOSIS — Z91.09: ICD-10-CM

## 2018-06-29 DIAGNOSIS — A41.9: Primary | ICD-10-CM

## 2018-06-29 DIAGNOSIS — D68.32: ICD-10-CM

## 2018-06-29 DIAGNOSIS — Z51.5: ICD-10-CM

## 2018-06-29 DIAGNOSIS — D62: ICD-10-CM

## 2018-06-29 DIAGNOSIS — I50.31: ICD-10-CM

## 2018-06-29 DIAGNOSIS — Z90.10: ICD-10-CM

## 2018-06-29 DIAGNOSIS — J96.01: ICD-10-CM

## 2018-06-29 DIAGNOSIS — D75.89: ICD-10-CM

## 2018-06-29 DIAGNOSIS — K51.90: ICD-10-CM

## 2018-06-29 DIAGNOSIS — K51.511: ICD-10-CM

## 2018-06-29 DIAGNOSIS — K51.811: ICD-10-CM

## 2018-06-29 DIAGNOSIS — Z88.0: ICD-10-CM

## 2018-06-29 DIAGNOSIS — I47.1: ICD-10-CM

## 2018-06-29 DIAGNOSIS — T45.515A: ICD-10-CM
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
## 2018-06-29 DIAGNOSIS — Z79.52: ICD-10-CM

## 2018-06-29 DIAGNOSIS — R65.21: ICD-10-CM

## 2018-06-29 LAB
ALBUMIN SERPL BCG-MCNC: 2.7 G/DL (ref 3.4–4.8)
ALP SERPL-CCNC: 73 U/L (ref 40–150)
ALT SERPL W P-5'-P-CCNC: 15 U/L (ref 8–55)
ANALYZER IN CARDIO: (no result)
ANION GAP SERPL CALC-SCNC: 16 MMOL/L (ref 10–20)
AST SERPL-CCNC: 28 U/L (ref 5–34)
BASE EXCESS STD BLDV CALC-SCNC: -3.4 MEQ/L
BICARBONATE (HCO3V): 22.8 MMOL/L (ref 1–85)
BILIRUB SERPL-MCNC: 0.8 MG/DL (ref 0.2–1.2)
BUN SERPL-MCNC: 13 MG/DL (ref 9.8–20.1)
CA-I BLD-SCNC: 0.94 MMOL/L (ref 1.12–1.32)
CA-I BLDV-MCNC: 0.96 MMOL/L (ref 1.16–1.32)
CALCIUM SERPL-MCNC: 7.9 MG/DL (ref 7.8–10.44)
CHLORIDE BLDV-SCNC: 102 MMOL/L (ref 98–106)
CHLORIDE SERPL-SCNC: 104 MMOL/L (ref 98–107)
CHLORIDE SERPL-SCNC: 106 MMOL/L (ref 98–113)
CK MB SERPL-MCNC: 4.8 NG/ML (ref 0–6.6)
CO2 BLDV CALC-SCNC: 23.9 MMOL/L (ref 1–85)
CO2 SERPL-SCNC: 22 MMOL/L (ref 23–31)
CO2 TENSION (PVCO2): 33.4 MMHG (ref 41–51)
CREAT CL PREDICTED SERPL C-G-VRATE: 0 ML/MIN (ref 70–130)
GIANT PLATELETS BLD QL SMEAR: SLIGHT
GLOBULIN SER CALC-MCNC: 3.1 G/DL (ref 2.4–3.5)
GLUCOSE SERPL-MCNC: 140 MG/DL (ref 83–110)
HCO3 BLDV-SCNC: 21 MEQ/L (ref 22–28)
HCT VFR BLD CALC: 49 % (ref 36–52)
HCT VFR BLDV CALC: 48.7 % (ref 44–64)
HEMOGLOBIN - CALC: 16.6 G/DL (ref 12–18)
HGB BLD-MCNC: 14.8 G/DL (ref 12–16)
HGB BLD-MCNC: 15.3 G/DL (ref 12–16)
HGB BLDV-MCNC: 15.5 G/DL (ref 11.7–16.1)
MCH RBC QN AUTO: 32.8 PG (ref 27–31)
MCV RBC AUTO: 101 FL (ref 78–98)
MDIFF COMPLETE?: YES
O2 TENSION (PVO2): 64.9 MMHG (ref 35–45)
PLATELET # BLD AUTO: 195 THOU/UL (ref 130–400)
PLATELET # BLD AUTO: 263 THOU/UL (ref 130–400)
PLATELET BLD QL SMEAR: (no result)
POTASSIUM BLDV-SCNC: 3.9 MMOL/L (ref 3.7–5.3)
POTASSIUM SERPL-SCNC: 3.2 MMOL/L (ref 3.4–4.7)
POTASSIUM SERPL-SCNC: 3.3 MMOL/L (ref 3.5–5.1)
RBC # BLD AUTO: 4.51 MILL/UL (ref 4.2–5.4)
SAO2 % BLDV FROM PO2: 93.4 % (ref 94–98)
SODIUM BLDV-SCNC: 136.5 MMOL/L (ref 133–146)
SODIUM SERPL-SCNC: 139 MMOL/L (ref 136–145)
SODIUM SERPL-SCNC: 140 MMOL/L (ref 138–145)
SP GR UR STRIP: 1.01 (ref 1–1.04)
TROPONIN I SERPL DL<=0.01 NG/ML-MCNC: 0.31 NG/ML (ref ?–0.03)
TROPONIN I SERPL DL<=0.01 NG/ML-MCNC: 0.32 NG/ML (ref ?–0.03)
TROPONIN I SERPL DL<=0.01 NG/ML-MCNC: 0.33 NG/ML (ref ?–0.03)
WBC # BLD AUTO: 29.1 THOU/UL (ref 4.8–10.8)

## 2018-06-29 PROCEDURE — C9113 INJ PANTOPRAZOLE SODIUM, VIA: HCPCS

## 2018-06-29 PROCEDURE — 83605 ASSAY OF LACTIC ACID: CPT

## 2018-06-29 PROCEDURE — 83735 ASSAY OF MAGNESIUM: CPT

## 2018-06-29 PROCEDURE — 84484 ASSAY OF TROPONIN QUANT: CPT

## 2018-06-29 PROCEDURE — 86901 BLOOD TYPING SEROLOGIC RH(D): CPT

## 2018-06-29 PROCEDURE — 87086 URINE CULTURE/COLONY COUNT: CPT

## 2018-06-29 PROCEDURE — 82553 CREATINE MB FRACTION: CPT

## 2018-06-29 PROCEDURE — 96365 THER/PROPH/DIAG IV INF INIT: CPT

## 2018-06-29 PROCEDURE — 36415 COLL VENOUS BLD VENIPUNCTURE: CPT

## 2018-06-29 PROCEDURE — 71045 X-RAY EXAM CHEST 1 VIEW: CPT

## 2018-06-29 PROCEDURE — C1769 GUIDE WIRE: HCPCS

## 2018-06-29 PROCEDURE — 82803 BLOOD GASES ANY COMBINATION: CPT

## 2018-06-29 PROCEDURE — 82805 BLOOD GASES W/O2 SATURATION: CPT

## 2018-06-29 PROCEDURE — 93306 TTE W/DOPPLER COMPLETE: CPT

## 2018-06-29 PROCEDURE — 74018 RADEX ABDOMEN 1 VIEW: CPT

## 2018-06-29 PROCEDURE — 84100 ASSAY OF PHOSPHORUS: CPT

## 2018-06-29 PROCEDURE — 80162 ASSAY OF DIGOXIN TOTAL: CPT

## 2018-06-29 PROCEDURE — 85025 COMPLETE CBC W/AUTO DIFF WBC: CPT

## 2018-06-29 PROCEDURE — 87040 BLOOD CULTURE FOR BACTERIA: CPT

## 2018-06-29 PROCEDURE — 85730 THROMBOPLASTIN TIME PARTIAL: CPT

## 2018-06-29 PROCEDURE — 87324 CLOSTRIDIUM AG IA: CPT

## 2018-06-29 PROCEDURE — 87449 NOS EACH ORGANISM AG IA: CPT

## 2018-06-29 PROCEDURE — 37191 INS ENDOVAS VENA CAVA FILTR: CPT

## 2018-06-29 PROCEDURE — 3E033XZ INTRODUCTION OF VASOPRESSOR INTO PERIPHERAL VEIN, PERCUTANEOUS APPROACH: ICD-10-PCS | Performed by: EMERGENCY MEDICINE

## 2018-06-29 PROCEDURE — 86850 RBC ANTIBODY SCREEN: CPT

## 2018-06-29 PROCEDURE — 96361 HYDRATE IV INFUSION ADD-ON: CPT

## 2018-06-29 PROCEDURE — 76942 ECHO GUIDE FOR BIOPSY: CPT

## 2018-06-29 PROCEDURE — 84443 ASSAY THYROID STIM HORMONE: CPT

## 2018-06-29 PROCEDURE — 80053 COMPREHEN METABOLIC PANEL: CPT

## 2018-06-29 PROCEDURE — 81003 URINALYSIS AUTO W/O SCOPE: CPT

## 2018-06-29 PROCEDURE — 93005 ELECTROCARDIOGRAM TRACING: CPT

## 2018-06-29 PROCEDURE — 71275 CT ANGIOGRAPHY CHEST: CPT

## 2018-06-29 PROCEDURE — 86900 BLOOD TYPING SEROLOGIC ABO: CPT

## 2018-06-29 PROCEDURE — 93970 EXTREMITY STUDY: CPT

## 2018-06-29 PROCEDURE — 83880 ASSAY OF NATRIURETIC PEPTIDE: CPT

## 2018-06-29 PROCEDURE — A4216 STERILE WATER/SALINE, 10 ML: HCPCS

## 2018-06-29 PROCEDURE — 82330 ASSAY OF CALCIUM: CPT

## 2018-06-29 PROCEDURE — 80048 BASIC METABOLIC PNL TOTAL CA: CPT

## 2018-06-29 RX ADMIN — HYDROCODONE BITARTRATE AND ACETAMINOPHEN PRN TAB: 5; 325 TABLET ORAL at 21:06

## 2018-06-29 NOTE — HP
PRIMARY CARE PHYSICIAN:  Dr. Anand Adkins.

 

REASON FOR ADMISSION:  Transfer from other emergency room for hypotension.

 

HISTORY OF PRESENT ILLNESS:  An 83-year-old female who has a history of 
ulcerative colitis who was recently admitted in our hospital on 06/18/2018.  
During that admission, patient had a colonoscopy by Dr. Olmos and found with 
severe inflammation in the colon involving the transverse, descending, sigmoid, 
and rectum.  Subsequently, pathology report came back as chronic active 
colitis.  During that admission, patient was treated with IV steroid.  The 
patient was discharged yesterday on oral steroid.

 

After discharge, patient was relatively doing okay, but she was having chest 
pain, shortness of breath, dizziness, and that is why she went to [facility].  Over there, patient was hypotensive, tachycardic.  The patient'
s blood pressure was 80/40 and that is why they started on Levophed and 
subsequently patient was sent to our emergency room.  The patient had 
leukocytosis and patient was diagnosed as septic shock.  The patient was given 
broad spectrum antibiotic therapy.  Her lactic acid was elevated, her D-dimer 
was elevated, her BNP was elevated and her lactic acid was elevated.  The 
patient was given Lasix, Rocephin 1 gram and vancomycin.  Patient was also 
given sodium bicarbonate.  The patient did not made urine since yesterday.  In 
our emergency room, the patient made little urine output.

 

When she came to our emergency room, the patient's blood pressure was 
stabilized and ultimately Levophed was turned off, but patient's heart rate was 
variable from 120-190.  The patient was also given IV fluid.  The patient did 
not have any fever.  She denies any nausea, vomiting.  She denies any diarrhea, 
hematochezia, melena.  She denies any abdominal pain.  She reports rib pain.  
She feels dizzy and weak.

 

Her troponin is significantly elevated.

 

PAST MEDICAL HISTORY:  Ulcerative colitis.

 

PAST SURGICAL HISTORY:  Tonsillectomy, ovarian surgery, appendicectomy, 
mastectomy in 1971, cholecystectomy in 1988, left knee surgery, cataract surgery
, left total knee replacement in 2014.

 

PAST PSYCHIATRIC HISTORY:  Reviewed and negative.

 

SOCIAL HISTORY:  Patient is  and lives with her .  No history of 
tobacco, alcohol or illicit drug abuse.

 

ALLERGIES:  CLINDAMYCIN, LEVOFLOXACIN, PENICILLIN, MIRALAX, SULFONAMIDE.

 

FAMILY HISTORY:  Positive for heart attack to her father in late 80s.  Otherwise
, no strong family history of stroke or other cancer.

 

CURRENT HOME MEDICATIONS:  Vitamin C 500 mg p.o. daily, Lipitor 40 mg p.o. at 
bedtime, budesonide EC 3 capsules p.o. in the morning, calcium with vitamin D 1 
tablet p.o. daily, melatonin 10 mg p.o. at bedtime, Pentasa 1000 mg p.o. q.i.d.
, multivitamin with iron 1 tablet p.o. daily, omeprazole 20 mg p.o. daily, 
prednisone was prescribed recently tapering doses, Florastor 250 mg p.o. daily, 
triazolam 0.5 mg p.o. at bedtime.

 

EMERGENCY ROOM COURSE:  At [facility] in [facility] completely 
reviewed.  The patient was given Rocephin, vancomycin, bicarbonate, Lasix, IV 
fluid.



REVIEW OF SYSTEMS:  The following complete review of systems was

negative, unless otherwise mentioned in the HPI or below:

Constitutional:  Weight loss or gain, ability to conduct usual

activities.

Skin:  Rash, itching.

Eyes:  Double vision, pain.

ENT/Mouth:  Nose bleeding, neck stiffness, pain, tenderness.

Cardiovascular:  Palpitations, dyspnea on exertion, orthopnea.

Respiratory:  Shortness of breath, wheezing, cough, hemoptysis, 

fever or night sweats.

Gastrointestinal:  Poor appetite, abdominal pain, heartburn, 

nausea, vomiting, constipation, or diarrhea.

Genitourinary:  Urgency, frequency, dysuria, nocturia.

Musculoskeletal:  Pain, swelling.

Neurologic/Psychiatric:  Anxiety, depression.

Allergy/Immunologic:  Skin rash, bleeding tendency.

Please see my HPI for pertinent positive and negative.  All other review of 
systems reviewed and negative except as mentioned in the HPI. 

 

PHYSICAL EXAMINATION:

VITAL SIGNS:  On arrival here, last blood pressure 116/78, pulse 116-190 
variable irregular, respiratory rate 22, saturation 100% on 3 liter oxygen, 
weight 85 kilograms, temperature 97.4.

GENERAL:  Patient is currently alert, awake, follows command.  No obvious acute 
distress.

HEAD:  Normocephalic, atraumatic.

EYES:  Pupils round, reactive to light.  Extraocular muscle intact.

ENT:  Oropharynx within normal limits.  Moist mucous membranes.  No oral lesion
, no pharyngeal erythema, no exudate.

NECK:  Supple, no JVD, no thyromegaly, no carotid bruit.

LUNGS:  Few basal rales noted in no wheezing, no rhonchi, no accessory muscles 
of respiration in use.

CARDIAC:  S1, S2, tachycardia, irregular.  No murmur elicited, no gallop, no 
rub.

ABDOMEN:  Patient does have mild tenderness in the left upper quadrant, no 
peritoneal sign, no guarding, no rigidity, no rebound, no suprapubic tenderness.

BACK:  Unremarkable.  No CVA tenderness.

EXTREMITIES:  Upper extremity:  Patient does have ecchymosis bilaterally.  
Range of motion is normal.  Lower extremity:  Patient does have ecchymosis in 
the skin bilaterally, edema noted.  Good distal pulsation.

SKIN:  Ecchymosis noted.  No other rash.

NEUROLOGIC:  Nonfocal examination.  She is moving all 4 limbs.  Plantar 
bilateral flexor.

PSYCHIATRIC:  Normal affect.

 

SIGNIFICANT LABORATORY DATA:  EKG showing atrial fibrillation with rapid 
ventricular response.  Patient has variable degree of arrhythmia on the 
monitor.  CBC:  WBC 29.1, hemoglobin 14.8, .0, platelet 263 with 
bandemia.  VBG:  A pH 7.44, bicarbonate 22.8, CO2 of 33.4, O2 of 64.9.  BMP:  
Sodium 139, potassium 3.3, chloride 104, carbon dioxide 22, anion gap 16, BUN 13
, creatinine 0.67, glucose 140, calcium 7.9, Lactic acid 4.1.  LFT:  AST 28, 
ALT is 15, alkaline phosphatase 73, albumin 2.7, CK-MB 4.8, troponin 0.319, BNP 
was elevated and D-dimer was elevated at [facility].  
Urinalysis normal.  Chest x-ray is unremarkable.

 

ASSESSMENT AND PLAN/IMPRESSION:

1.  Acute hypotension.  Most likely patient's hypotension is related with her 
atrial tachyarrhythmia, suspected for atrial fibrillation with RVR underlying 
multifocal atrial tachycardia or multifocal atrial tachycardia cannot be 
entirely excluded.  Patient required Levophed drip momentarily and now the 
patient's blood pressure is stabilized, but still marginal.  We will check 
random cortisol level.  We will check CT angio to rule out PE given recent 
hospitalization.  We will closely monitor in medical ICU.  We will consult 
Pulmonary and Critical Care.  We will also consult Cardiology.  If needed, will 
consider vasopressor support.  Other possibility is sepsis given leukocytosis, 
but that can be explained by recent use of steroid.  Her lactic acid can be 
explained by hypoperfusion from hypotension, sepsis is still consideration and 
that is why we will consider broad spectrum antibiotic therapy with meropenem.  
Blood culture, urine culture obtained and we will follow up on culture result.

2.  Atrial tachyarrhythmia.  At this point EKG suspected for atrial 
fibrillation with RVR.  Monitor is showing different type of atrial 
tachyarrhythmia.  I am not clinically sure at this point.  We will consult 
Cardiology.  We are giving digoxin 0.5 mg to slow the rate.  The patient 
probably may not tolerate Cardizem drip because of blood pressure is low.  We 
will obtain echocardiography.  We will check thyroid function test.  We will 
monitor on cardiac monitor and Cardiology help is appreciated.

3.  Leukocytosis.  Could be related with sepsis, but recent use of steroid for 
ulcerative colitis can explain leukocytosis.  We will monitor CBC.

4.  Lactic acidosis, likely related with hypoperfusion, sepsis cannot be 
entirely excluded.  Patient is already on gentle fluid and broad spectrum 
antibiotic therapy.

5.  Non-ST-elevation myocardial infarction, type 2 demand ischemia secondary to 
tachycardia and hypoperfusion.  We will do serial cardiac enzymes x3.  
Echocardiography will be obtained.  Patient will be given aspirin.  
Anticoagulation will defer to Cardiology.  We will obtain echocardiography 
monitor on cardiac monitor.

6.  Hypokalemia.  We will replace potassium for 20 mEq IV one time dose.

7.  Macrocytosis.  We will continue folic acid and vitamin B12 therapy.

8.  Ulcerative colitis.  We will consult gastroenterologist and continue with 
Solu-Medrol 40 mg IV q.6 hourly.  The patient will also continue her Pentasa 1 
gram q.i.d.

9.  Dyslipidemia.  Continue Lipitor 40 mg p.o. at bedtime and check lipid 
profile tomorrow.

10.  Gastroesophageal reflux disease.  We will continue Protonix 40 mg IV daily.

11.  Deep venous thrombosis prophylaxis.  Lovenox 40 mg subcu daily.

12.  Gastrointestinal prophylaxis, Protonix 40 mg IV daily.

13.  Code status:  The patient is FULL CODE and patient's  is surrogate 
decision maker.

 

Disposition plan based on clinical course.  We are expecting patient's stay in 
hospital more than 2 midnights.  Plan of care discussed with the patient in 
detail.

 

ADDENDUM:  We ordered CT angiography and it is positive for bilateral pulmonary 
embolism and saddle emboli and that is why we decided to start heparin drip 
that explains everything including low blood pressure, atrial tachyarrhythmia 
and demand ischemia.  The patient also has associated hypoxic respiratory 
failure.  We will order ultrasound of the lower extremity to rule out DVT and 
we will keep her strict bedrest to prevent any compromise.  Pulmonary group 
will be consulted.

 

SUZANNED

## 2018-06-29 NOTE — DIS
DATE OF ADMISSION:  06/18/2018

 

DATE OF DISCHARGE:  06/28/2018

 

DISCHARGE DISPOSITION:  To home with home health and PT.

 

PRIMARY DISCHARGE DIAGNOSIS:  Likely ulcerative colitis with GI bleeding.

 

SECONDARY DISCHARGE DIAGNOSES:  Dyslipidemia, deconditioning.

 

PROCEDURES DONE DURING HOSPITALIZATION:  The patient had colonoscopy done which 
showed severe colitis involving transverse, descending, sigmoid, and rectum 
consistent with ulcerative colitis, left-sided diverticulosis coli was seen, 
internal and external hemorrhoids were also seen.  Histopathology of biopsies 
obtained on colonoscopy showed chronic active colitis, moderate to kelsey.  No 
dysplasia or malignancy was seen.  No granulomata were seen on the 
histopathology.  Discharge H and H 12 and 36, platelet count 160.  Hepatitis 
panel was nonreactive.  HIV 1 and 2 nonreactive.  Stool for Clostridium 
difficile, Campylobacter, Shiga toxins were all negative.

 

INPATIENT CONSULTS:  Dr. Willian Rich for Gastroenterology.

 

DISCHARGE PLAN:  Patient to follow up with Dr. Rich in 2-3 weeks and primary 
care physician in 1 week.

 

BRIEF COURSE DURING HOSPITALIZATION:  Patient initially got admitted on the 
18th with complaints of frequent stools with blood and mucus mixed.  She also 
had a temperature of 100 degrees.  In view of this history, the patient was 
admitted to medical floor.  She had initial stool workup for infectious agents 
being negative.  She had known history of microscopic colitis and was on 
budesonide prior to arrival here.  Colonoscopy was done, which showed findings 
suggestive of ulcerative colitis.  She had severe colitis of entire left colon 
along with sigmoid and rectum as well.  The histopathology is also suggestive 
of ulcerative colitis.  The patient was started on mesalamine and IV steroids.  
She has been transitioned to oral prednisone.  The patient needs to continue 
prednisone for a total of 5 weeks.  This is a tapering dose starting at 40 mg 
daily.  She is also on mesalamine 1000 mg 4 times daily.  Dr. Willian Rich 
will be following up with the patient in 2-3 weeks for possible steroid sparing 
biologic agent.  Prior to discharge, the patient is ambulating and tolerating 
oral solid diet.  Please see a face-to-face documentation on [facility] for the 
day of discharge.

 

[facility]

## 2018-06-29 NOTE — CT
CT ANGIO CHEST WITH CONTRAST:

 

INDICATIONS:

Hypotension.  Septic shock.  Tachycardia.

 

TECHNIQUE:

Multiple axial tomograms obtained through the chest with IV enhancement, following the pulmonary silverio
o protocol, with multiplanar reconstruction and 3D post processing.

 

FINDINGS:

There is a large saddle-type embolus seen in the left main pulmonary artery, with embolus extending i
nto the left upper lobe, middle lobe, and lower lobe pulmonary arteries.

 

There is also a pulmonary embolus in the right upper lobe pulmonary artery, proximally.

 

There are small bilateral pleural effusions.  Chronic lung parenchymal changes with interstitial thic
kening and stranding in the lung bases.  No evidence of adenopathy.  Images through the upper abdomen
 are unremarkable.

 

IMPRESSION:

1.  Bilateral pulmonary emboli with a large saddle type embolus in the left main pulmonary artery.

 

2.  There are chronic lung parenchymal changes, as described.

 

Findings were related to the patient's nurse, in the emergency room, at the time of this dictation.

 

CODE CR

 

POS: [facility]

## 2018-06-29 NOTE — ULT
BILATERAL LOWER EXTREMITY VENOUS ULTRASOUND WITH DOPPLER:

6/29/18

 

HISTORY: 

Bilateral pulmonary artery emboli. 

 

COMPARISON:  

None.

 

TECHNIQUE:  

Gray scale, color flow, doppler images with spectral waveform analysis performed of the left and righ
t lower extremity venous system.

 

FINDINGS:  

 

RIGHT LOWER EXTREMITY:

There is compressibility, presence of flow and augmentation in the common femoral vein, femoral vein,
 and popliteal vein. There is flow in the greater saphenous vein, profunda vein, and posterior tibial
 vein. 

 

LEFT LOWER EXTREMITY: 

There is compressibility, presence of flow and augmentation in the common femoral vein. There is flow
 in the greater saphenous vein and profunda vein. There is echogenic material with incomplete winston
sibility involving the femoral vein compatible with thrombus. Popliteal vein and posterior tibial vei
n are patent. 

 

IMPRESSION:  

Thrombus in the proximal left lower extremity femoral vein. 

 

POS: [facility]

## 2018-06-29 NOTE — RAD
CHEST 1 VIEW:

 

HISTORY: 

Cough.

 

FINDINGS: 

Cardiac silhouette is magnified by projection.  Pulmonary vasculature unremarkable.  Mediastinum midl
ine with aortic calcification.  No lobar consolidation.  Linear scarring at the lung bases.  No evide
nce of pneumothorax.

 

IMPRESSION: 

No active cardiopulmonary abnormalities are demonstrated.

 

POS: [facility]

## 2018-06-30 LAB
ALBUMIN SERPL BCG-MCNC: 2.3 G/DL (ref 3.4–4.8)
ALP SERPL-CCNC: 61 U/L (ref 40–150)
ALT SERPL W P-5'-P-CCNC: 12 U/L (ref 8–55)
ANION GAP SERPL CALC-SCNC: 11 MMOL/L (ref 10–20)
ANION GAP SERPL CALC-SCNC: 11 MMOL/L (ref 10–20)
APTT PPP: (no result) SEC (ref 22.9–36.1)
APTT PPP: 120.1 SEC (ref 22.9–36.1)
APTT PPP: 209.6 SEC (ref 22.9–36.1)
AST SERPL-CCNC: 21 U/L (ref 5–34)
BILIRUB SERPL-MCNC: 0.6 MG/DL (ref 0.2–1.2)
BUN SERPL-MCNC: 13 MG/DL (ref 9.8–20.1)
BUN SERPL-MCNC: 13 MG/DL (ref 9.8–20.1)
CALCIUM SERPL-MCNC: 7.4 MG/DL (ref 7.8–10.44)
CALCIUM SERPL-MCNC: 7.5 MG/DL (ref 7.8–10.44)
CHLORIDE SERPL-SCNC: 107 MMOL/L (ref 98–107)
CHLORIDE SERPL-SCNC: 108 MMOL/L (ref 98–107)
CO2 SERPL-SCNC: 21 MMOL/L (ref 23–31)
CO2 SERPL-SCNC: 22 MMOL/L (ref 23–31)
CREAT CL PREDICTED SERPL C-G-VRATE: 88 ML/MIN (ref 70–130)
CREAT CL PREDICTED SERPL C-G-VRATE: 88 ML/MIN (ref 70–130)
GLOBULIN SER CALC-MCNC: 2.8 G/DL (ref 2.4–3.5)
GLUCOSE SERPL-MCNC: 184 MG/DL (ref 83–110)
GLUCOSE SERPL-MCNC: 188 MG/DL (ref 83–110)
HGB BLD-MCNC: 13.5 G/DL (ref 12–16)
HGB BLD-MCNC: 14.2 G/DL (ref 12–16)
MACROCYTES BLD QL SMEAR: (no result) (100X)
MAGNESIUM SERPL-MCNC: 1.9 MG/DL (ref 1.6–2.6)
MCH RBC QN AUTO: 33.8 PG (ref 27–31)
MCH RBC QN AUTO: 34 PG (ref 27–31)
MCV RBC AUTO: 101 FL (ref 78–98)
MCV RBC AUTO: 102 FL (ref 78–98)
MDIFF COMPLETE?: YES
MDIFF COMPLETE?: YES
PLATELET # BLD AUTO: 207 THOU/UL (ref 130–400)
PLATELET # BLD AUTO: 224 THOU/UL (ref 130–400)
PLATELET BLD QL SMEAR: (no result)
PLATELET BLD QL SMEAR: (no result)
POTASSIUM SERPL-SCNC: 3.7 MMOL/L (ref 3.5–5.1)
POTASSIUM SERPL-SCNC: 4.1 MMOL/L (ref 3.5–5.1)
RBC # BLD AUTO: 4 MILL/UL (ref 4.2–5.4)
RBC # BLD AUTO: 4.19 MILL/UL (ref 4.2–5.4)
SODIUM SERPL-SCNC: 135 MMOL/L (ref 136–145)
SODIUM SERPL-SCNC: 137 MMOL/L (ref 136–145)
TROPONIN I SERPL DL<=0.01 NG/ML-MCNC: 0.18 NG/ML (ref ?–0.03)
WBC # BLD AUTO: 21.5 THOU/UL (ref 4.8–10.8)
WBC # BLD AUTO: 23 THOU/UL (ref 4.8–10.8)

## 2018-06-30 RX ADMIN — CYANOCOBALAMIN TAB 1000 MCG SCH MCG: 1000 TAB at 08:14

## 2018-06-30 RX ADMIN — HYDROCODONE BITARTRATE AND ACETAMINOPHEN PRN TAB: 5; 325 TABLET ORAL at 17:02

## 2018-06-30 NOTE — PDOC.PN
- Subjective


Encounter Start Date: 06/30/18


Encounter Start Time: 15:03


Subjective: feels OK. tired but no new complaints


-: denies any CP/SOb.no abd pain


-: no BM sinec presentation.no Blood per rectum





- Objective


Resuscitation Status: 


 











Resuscitation Status           FULL:Full Resuscitation














MAR Reviewed: Yes


Vital Signs & Weight: 


 Vital Signs (12 hours)











  Temp Pulse Resp BP Pulse Ox


 


 06/30/18 11:18  98.8 F  89  20  95/68  91 L


 


 06/30/18 07:23  97.9 F  76  18   94 L


 


 06/30/18 07:16  97.9 F  76  18  118/72  91 L


 


 06/30/18 03:45  98.0 F  91  16  100/65  90 L








 Weight











Weight                         182 lb














I&O: 


 











 06/29/18 06/30/18 07/01/18





 06:59 06:59 06:59


 


Intake Total  1840 


 


Output Total  350 


 


Balance  1490 











Result Diagrams: 


 06/30/18 10:08





 06/30/18 10:08


Additional Labs: 





Microbiology





06/29/18 13:58   Venous blood - Right Hand   Blood Culture - Preliminary


                              Specimen has been received and culture in 

progress.


                              No Growth to date.


06/29/18 13:48   Urine holm catheter   Urine Culture - Preliminary


                              NO GROWTH AT 24 HOURS


06/29/18 13:44   Venous blood - Left Hand   Blood Culture - Preliminary


                              Specimen has been received and culture in 

progress.


                              No Growth to date.





 Laboratory Tests











  06/29/18 06/29/18 06/29/18





  13:58 13:58 13:58


 


Band Neuts % (Manual)  15 H  


 


Lactic Acid   4.1 H* 


 


Troponin I    0.319 H*


 


B-Natriuretic Peptide   














  06/29/18 06/29/18 06/29/18





  19:04 21:53 21:56


 


Band Neuts % (Manual)   37 H 


 


Lactic Acid   


 


Troponin I  0.327 H*   0.309 H*


 


B-Natriuretic Peptide   














  06/30/18 06/30/18 06/30/18





  10:08 10:08 10:08


 


Band Neuts % (Manual)  11  


 


Lactic Acid   


 


Troponin I   0.176 H 


 


B-Natriuretic Peptide    1110.3 H








labs reviewed





Phys Exam





- Physical Examination


Constitutional: NAD


HEENT: PERRLA, moist MMs, sclera anicteric, oral pharynx no lesions


Neck: no nodes, no JVD, supple, full ROM


Respiratory: no wheezing, no rales, no rhonchi, clear to auscultation bilateral


Cardiovascular: RRR, no significant murmur, no rub


Gastrointestinal: soft, non-tender, no distention, positive bowel sounds


Musculoskeletal: pulses present, edema present


Neurological: non-focal, normal sensation, moves all 4 limbs


Psychiatric: normal affect, A&O x 3





Dx/Plan


(1) Bilateral pulmonary embolism


Code(s): I26.99 - OTHER PULMONARY EMBOLISM WITHOUT ACUTE COR PULMONALE   Status

: Acute   





(2) Left leg DVT


Code(s): I82.402 - ACUTE EMBOLISM AND THOMBOS UNSP DEEP VEINS OF L LOW EXTREM   

Status: Acute   





(3) Atrial fibrillation with RVR


Code(s): I48.91 - UNSPECIFIED ATRIAL FIBRILLATION   Status: Acute   Comment: 

rate controlled on Amiodarone   





(4) Troponin level elevated


Code(s): R74.8 - ABNORMAL LEVELS OF OTHER SERUM ENZYMES   Status: Acute   

Comment: Likley demand ischemia from PE and a-fib w RVR. troponin trending down

   





(5) Acute diastolic CHF (congestive heart failure)


Code(s): I50.31 - ACUTE DIASTOLIC (CONGESTIVE) HEART FAILURE   Status: Acute   

Comment: BNP>1000   





(6) Ulcerative colitis, acute


Code(s): K51.90 - ULCERATIVE COLITIS, UNSPECIFIED, WITHOUT COMPLICATIONS   

Status: Acute   Comment: On IV steroids and Pentasa   





(7) HLD (hyperlipidemia)


Code(s): E78.5 - HYPERLIPIDEMIA, UNSPECIFIED   Status: Chronic   





(8) Physical deconditioning


Code(s): R53.81 - OTHER MALAISE   Status: Chronic   





- Plan


plan discussed w/ family, PT/OT, respiratory therapy, incentive spirometry, out 

of bed/ambulate, DVT proph w/SCDs


On heparin drip for saddle ,b/l PE.proceed w caution w recent GIB


-: cont Amiodarone .


-: Stop IVF. give 1 dose lasix. Diastolic dysfunction on ECHO


-: Will need IVC filter eventually as poor candidate for long term AC


-: cont IV steroids. GI consulted





* .daily labs.monitor H/h closely


* 


* bandemia better


* OT/PT if able to tolerate








Review of Systems





- Review of Systems


Constitutional: weakness, malaise.  negative: fever, chills, sweats, other


Respiratory: negative: Cough, Dry, Shortness of Breath, Hemoptysis, SOB with 

Excertion, Pleuritic Pain, Sputum, Wheezing


Cardiovascular: negative: chest pain, palpitations, orthopnea, paroxysmal 

nocturnal dyspnea, edema, light headedness, other


Gastrointestinal: negative: Nausea, Vomiting, Abdominal Pain, Diarrhea, 

Constipation, Melena, Hematochezia, Other


Genitourinary: negative: Dysuria, Frequency, Incontinence, Hematuria, Retention

, Other


Musculoskeletal: negative: Neck Pain, Shoulder Pain, Arm Pain, Back Pain, Hand 

Pain, Leg Pain, Foot Pain, Other


Skin: negative: Rash, Lesions, Sorin, Bruising, Other


Neurological: negative: Weakness, Numbness, Incoordination, Change in Speech, 

Confusion, Seizures, Other





- Medications/Allergies


Allergies/Adverse Reactions: 


 Allergies











Allergy/AdvReac Type Severity Reaction Status Date / Time


 


clindamycin [From Cleocin] Allergy   Verified 06/18/18 13:44


 


levofloxacin Allergy   Verified 06/18/18 13:44


 


Penicillins Allergy   Verified 06/18/18 13:44


 


polyethylene glycol Allergy   Verified 06/18/18 13:44





[From Colyte]     


 


polyethylene glycol 3350 Allergy   Verified 06/18/18 13:44





[From Colyte]     


 


potassium chloride Allergy   Verified 06/18/18 13:44





[From Colyte]     


 


sodium [From Colyte] Allergy   Verified 06/18/18 13:44


 


sodium bicarbonate Allergy   Verified 06/18/18 13:44





[From Colyte]     


 


sodium chloride [From Colyte] Allergy   Verified 06/18/18 13:44


 


sodium sulfate [From Colyte] Allergy   Verified 06/18/18 13:44


 


Sulfa (Sulfonamide Allergy   Verified 06/18/18 13:44





Antibiotics)     











Medications: 


 Current Medications





Acetaminophen (Tylenol)  650 mg PO Q4H PRN


   PRN Reason: Headache/Fever or Pain


   Last Admin: 06/29/18 17:42 Dose:  650 mg


Hydrocodone Bitart/Acetaminophen (Norco 5/325)  1 tab PO Q4H PRN


   PRN Reason: Moderate Pain (4-6)


   Last Admin: 06/29/18 21:06 Dose:  1 tab


Al Hydroxide/Mg Hydroxide (Maalox)  30 ml PO Q6H PRN


   PRN Reason: Heartburn  or Indigestion


Artificial Tears (Tears Naturale)  0 drop EA EYE PRN PRN


   PRN Reason: Dry Eyes


Atorvastatin Calcium (Lipitor)  40 mg PO HS ERIN


   Last Admin: 06/29/18 21:12 Dose:  40 mg


Cyanocobalamin (Vitamin B-12)  1,000 mcg PO DAILY [facility]


   Last Admin: 06/30/18 08:14 Dose:  1,000 mcg


Dronedarone (Multaq)  400 mg PO BID-[facility]


   Last Admin: 06/30/18 08:13 Dose:  400 mg


Folic Acid (Folvite)  1 mg PO DAILY [facility]


   Last Admin: 06/30/18 08:14 Dose:  1 mg


Guaifenesin (Robitussin Sf)  200 mg PO Q4H PRN


   PRN Reason: Cough


Heparin Sodium (Porcine) (Heparin 1,000 Units/Ml (10 Ml))  0 units SLOW IVP 

ASDIR [facility]


   PRN Reason: Protocol


Heparin Sodium/Dextrose (Heparin 25,000 Units/D5w 500 Ml)  500 mls @ 0 mls/hr 

IVPB INF ERIN; Per Protocol


   PRN Reason: Protocol


   Last Admin: 06/30/18 10:02 Dose:  500 mls


Cefepime HCl 1 gm/ Sodium (Chloride)  100 mls @ 200 mls/hr IVPB 1000,2200 [facility]


   Last Admin: 06/30/18 11:00 Dose:  100 mls


Loperamide HCl (Imodium)  2 mg PO PRN PRN


   PRN Reason: Diarrhea/Loose Stools


Loratadine (Claritin)  10 mg PO DAILYPRN PRN


   PRN Reason: Sinus Symptoms


Magnesium Hydroxide (Milk Of Magnesium)  30 ml PO DAILYPRN PRN


   PRN Reason: Constipation


Melatonin (Melatonin)  9 mg PO [facility]


   Last Admin: 06/29/18 21:12 Dose:  9 mg


Mesalamine (Pentasa)  1,000 mg PO QID [facility]


   Last Admin: 06/30/18 12:32 Dose:  1,000 mg


Methylprednisolone Sodium Succinate (Solu-Medrol)  40 mg IVP BID [facility]


Mineral Oil/White Petrolatum (Eucerin Cream)  0 gm TOP BIDPRN PRN


   PRN Reason: Dry Skin


Ondansetron HCl (Zofran Odt)  4 mg PO Q6H PRN


   PRN Reason: Nausea/Vomiting


Ondansetron HCl (Zofran)  4 mg IVP Q6H PRN


   PRN Reason: Nausea/Vomiting


   Last Admin: 06/29/18 20:28 Dose:  4 mg


Pantoprazole Sodium (Protonix)  40 mg IVP Q12HR [facility]


   Last Admin: 06/30/18 08:14 Dose:  40 mg


Phenol (Chloraseptic Spray 180 Ml Bot)  0 ml PO PRN PRN


   PRN Reason: Sore Throat


Senna (Senokot)  2 tab PO HSPRN PRN


   PRN Reason: Constipation


Sodium Chloride (Ocean Nasal Spray 0.65%)  0 ml EA NARE QIDPRN PRN


   PRN Reason: Nasal Congestion


Zolpidem Tartrate (Ambien)  5 mg PO HSPRN PRN


   PRN Reason: Insomnia

## 2018-06-30 NOTE — CON
DATE OF CONSULTATION:  06/29/2018

 

HISTORY:  Elina Garcia is an 83-year-old white female who was discharged 
yesterday after 10 days in the hospital.  She was found to have ulcerative 
colitis with frequent stools mixed with blood and mucus.  She was started on IV 
steroids and transitioned to oral prednisone.

 

She was discharged yesterday and started having some lower rib margin upper 
abdominal pain associated with shortness of breath and dizziness.  She went to 
the [place] [facility] and was tachycardic and hypotensive.  Blood pressure 
was 80/40.  She started Levophed and transferred here.  She was given broad-
spectrum antibiotics.  She has leukocytosis and elevated lactic acid.  She was 
given Rocephin 1 gram and vancomycin.  Ultimately the Levophed was tapered and 
discontinued.

 

PAST MEDICAL HISTORY:  Ulcerative colitis.

 

OPERATIONS:  Ovarian surgery, appendectomy, tonsillectomy, mastectomy, 
cholecystectomy, left knee surgery, cataract surgery, and then left knee 
replacement.

 

MEDICATIONS:  Ascorbic acid 500 mg daily, Lipitor 40 daily, melatonin 10 mg at 
bedtime, Pentasa 1000 mg q.i.d., omeprazole 20 mg daily, prednisone 20 mg b.i.d.
, Florastor 250 mg daily.

 

ALLERGIES:  CLINDAMYCIN, LEVOFLOXACIN, PENICILLINS, POLYETHYLENE GLYCOL, 
POTASSIUM CHLORIDE, SODIUM BICARBONATE, SODIUM CHLORIDE.

 

FAMILY HISTORY:  Father had myocardial infarction.

 

REVIEW OF SYSTEMS:  Twelve-point review of systems unremarkable except as noted 
above.

 

PHYSICAL EXAMINATION:

VITAL SIGNS:  Blood pressure 130/75, pulse of 110 times on the monitor, her 
heart rate increases up to 150-160.

HEENT:  PERRL.

CHEST:  Clear.

CARDIAC:  S1, S2 normal without any S3, S4, or murmurs.

ABDOMEN:  Normal bowel sounds with mild abdominal tenderness.  There is no 
rebound.

EXTREMITIES:  Revealed no clubbing, cyanosis, or edema.

NEUROLOGIC:  Grossly intact.

 

LABORATORY DATA:  EKG shows a multitude of narrow complex tachycardias.  At 
times, she appears to be in atrial fibrillation and other times 
supraventricular tachycardia with rate of 200 per minute.  White count 29,100, 
hemoglobin 14.8, hematocrit 45.7, platelets 263,000.  ABG: pH 7.389, pCO2 of 
35.2, pO2 of 195.5, troponin I is up to 0.327.  .8.  Sodium 139, 
potassium 3.3, chloride 104, carbon dioxide 22, BUN 13, creatinine 0.67, lactic 
acid 4.1.

 

IMPRESSION:

1.  New onset with various supraventricular arrhythmias.  At times, she has 
supraventricular tachycardia and other times with atrial fibrillation.  She has 
been given intravenous digoxin 0.5 thus far.

2.  Possible sepsis with elevated white count, hypotension.

3.  Ulcerative colitis with bloody bowel movements.

 

PLAN:  Ms. Garcia certainly is not a good long-term anticoagulant candidate 
with ulcerative colitis and frequent bloody bowel movements.  She will be 
covered with broad-spectrum antibiotics for her possible sepsis.  She will be 
loaded with digoxin also we will start Multaq in the hopes of better 
controlling these arrhythmias.  If that does not, then consideration may need 
to be given to intravenous amiodarone.  Also, echocardiogram will be performed 
to assess her left ventricular function.

 

RAMESH

## 2018-06-30 NOTE — CON
DATE OF CONSULTATION:  06/30/2018

 

REASON FOR CONSULTATION:  Ulcerative colitis, septic shock.

 

CONSULTING PHYSICIAN:  Dr. Adonay Stockton.

 

HISTORY OF PRESENT ILLNESS:  The patient is an 83-year-old female with past medical history of ulcera
tive colitis that was recently diagnosed during last hospitalization, now presenting with sepsis.  Up
on chart review of the prior hospitalization, she had been having mild increase in abdominal pain as 
well as significant stools that had been present for some time.  She ultimately underwent colonoscopy
 on 06/26/2018 which showed severe chronic ulcerative colitis involving the transverse and descending
, sigmoid colon, and rectum.  Biopsies were consistent with chronic active colitis with moderate to m
arked involvement, but no dysplasia or malignancy was identified.  She was subsequently placed on ora
l steroids and discharged to home with plans to follow up in the GI clinic for ultimately being place
d on either immunomodulator or biologic therapy.  However, shortly after being discharged from the [facility], she started having increased chest pain, shortness of breath, and dizziness which prompted he
r admission to the [place] [facility].  When being evaluated in the [place] [facility], she was noted to 
be hypotensive and tachycardic with a blood pressure of approximately 80/40 concerning for septic jose rafael
ck.  She was ultimately given IV fluids and started on pressor support with transfer to [facility] for further management.  While here in the hospitalization, she has been placed on broad spect
rum antibiotic therapy as well as increased IV fluids, but the pressure support has since been discon
tinued due to improvement in her clinical status.  She did have a mild elevation in her troponins con
cerning for demand ischemia per cardiology evaluation, but otherwise the patient is doing well today.
  She currently denies any nausea, vomiting, fevers, chills, dysphagia, odynophagia, GI bleeding, nery
rrhea or constipation.  She states that her last bowel movement was approximately 48 hours ago.

 

REVIEW OF SYSTEMS:  A 10 category review of systems was obtained with all responses negative except f
or the pertinent positives as listed in the HPI.

 

PAST MEDICAL HISTORY:  Ulcerative colitis.

 

PAST SURGICAL HISTORY:  Tonsillectomy, ovarian surgery, appendectomy, mastectomy, cholecystectomy, le
ft knee surgery, cataract surgery, and left total knee replacement in 2014.

 

FAMILY HISTORY:  Denies any GI malignancies or IBD.

 

SOCIAL HISTORY:  Denies any tobacco, alcohol or illicit drug use.

 

OUTPATIENT MEDICATIONS:  Reviewed.

 

ALLERGIES:  CLINDAMYCIN, LEVOFLOXACIN, PENICILLIN, MIRALAX and SULFONAMIDE.

 

PHYSICAL EXAMINATION:

VITAL SIGNS:  Temperature 98, pulse 106, blood pressure 122/77, respiratory rate 18, satting 93% on 2
 liters nasal cannula.

GENERAL:  The patient is lying in bed, in no acute distress, alert and oriented x4.

NECK:  Supple.  No JVD noted.

CARDIOVASCULAR:  Tachycardic rate, but regular rhythm.  No discernible murmurs, gallops or rubs.

RESPIRATORY:  Clear to auscultation bilaterally with no discernible wheezes or rales.

ABDOMEN:  Normoactive bowel sounds, soft, nontender, nondistended.

EXTREMITIES:  No cyanosis, clubbing or edema.

 

LABORATORY DATA:  CBC with a white blood cell count of 23, hemoglobin 14.2, hematocrit 42.7, platelet
s 224.  Chemistry with a sodium of 137, potassium 3.7, chloride 108, CO2 of 22, BUN 13, creatinine 0.
63, glucose 184, AST 21, ALT 12, alkaline phosphatase 61, total bilirubin 0.6, albumin 2.3.  Troponin
s 0.309.

 

IMAGING DATA:  CT of the chest obtained on 06/29/2018 showed bilateral pulmonary emboli with a large 
saddle type embolus in the left main pulmonary artery.  There was also a pulmonary embolus in the rig
ht upper pulmonary artery as well.

 

ASSESSMENT AND PLAN:  The patient is an 83-year-old female with past medical history of ulcerative co
litis presenting with tachycardia, hypotension, pulmonary emboli and symptoms concerning for sepsis.

 

Ulcerative colitis:  The patient was recently diagnosed with ulcerative colitis during her last hospi
talization in the end of June, characterized as increased mucosal inflammation and ulceration extendi
ng from the rectum into the transverse colon.  There was also some mild granularity within the right 
colon concerning for continued disease process there.  She was ultimately placed on oral steroid ther
apy and discharged to home; however, shortly after her discharge, she experienced increased hypotensi
on and tachycardia that required pressor support as part of her management.  Imaging findings on this
 admission were consistent with a saddle type pulmonary embolus which could potentially generate all 
of the symptoms she has been having up until this time.  With the recent diagnosis of ulcerative coli
tis, infection is also within the differential that could generate an elevated white blood cell count
 as well as hypotension and tachycardia (especially when associated with immunosuppressive therapy wi
th steroids on discharge).  From her ulcerative colitis standpoint, continuation of her steroids in l
ight of a possible infectious etiology may be ill-advised.

 

RECOMMENDATIONS:

1.  We would continue patient on mesalamine 2 grams b.i.d.

2.  We discontinue IV steroids for now in light of possible infectious etiology of her hypotension an
d tachycardia.

3.  I would continue anticoagulation therapy in light of large pulmonary embolism.

4.  We would continue antibiotics for now given a possible infectious etiology.

 

We will continue to monitor.  Please call with any additional questions.

## 2018-06-30 NOTE — CON
DATE OF CONSULTATION:  06/30/2018

 

HISTORY OF PRESENT ILLNESS:  This is a very pleasant 83-year-old female, essentially nonsmoker, who s
ustained multiple falls in [place], [place] in a nursing home over there.

 

She was brought in here because she was just recently discharged no more than 24-48 hours ago.  _____
diagnosis ulcerative colitis.  She underwent a colonoscopy by Dr. Rich.

 

In the ER, she was found to have new onset supraventricular arrhythmia and atrial fibrillation.  She 
was given digoxin in the ER.  There was concern she had sepsis somewhere down the line.  CT chest ang
io was done empirically and was found to have bilateral pulmonary emboli along with a left leg ultras
ound showing a left femoral vein DVT.

 

In fact, she denies any chest pain, shortness of breath, cough, wheezing.

 

Her main admission diagnosis was marked weakness.

 

PAST MEDICAL HISTORY:  Otherwise, pertinent mainly for colitis, hyperlipidemia, and insomnia.

 

PAST SURGICAL HISTORY:  Tonsil, cholecystectomy, mastectomy benign, colonoscopy.

 

MEDICATIONS:  List of medicine prior to the recent discharge included prednisone 20, omeprazole 20, P
entasa 1000 four times a day, melatonin, Lipitor, and budesonide EC 3 capsule in the morning for coli
tis.

 

ALLERGIES:  She has allergies to CLINDAMYCIN, LEVAQUIN, PENICILLIN, and SULFA.

 

SOCIAL/FAMILY HISTORY:  Unremarkable.

 

REVIEW OF SYSTEMS:  Otherwise negative.  No previous history of pneumonia.

 

PHYSICAL EXAMINATION:

VITAL SIGNS:  Sats are 94% on 2 liters, temperature 97, pulse 76, respiratory rate 18, blood pressure
 118/72.

CHEST:  Decreased breath sounds, no wheezing.

CARDIAC:  Normal S1, S2, no gallops.

ABDOMEN:  No mass.

 

LABORATORY DATA:  PTT is 120.  White count 23,000, H and H 14 and 42, platelet count is normal.  She 
has still got a big left shift, 50 segs, 37 bands.  Electrolytes are normal.

 

So far cultures are negative.

 

IMPRESSION:

1.  Ulcerative colitis with recent fall.

2.  Incidental finding of bilateral pulmonary emboli with DVT left leg.

3.  Supraventricular arrhythmias.

 

She was started on high dose steroids for ulcerative colitis.  IV heparin.  I would continue for the 
time being.  I am concerned that if she has a GI bleed, she may need inferior cava filter.  We will g
et an opinion from GI.  She has marked leukocytosis.  I would empirically start on some broad-spectru
m antibiotics in fact that was presenting symptom.  Possibly sepsis with a markedly elevated white co
unt on admission of 29,000.

 

Echo has been ordered.  A chest x-ray was reviewed on admission, which shows chronic changes, but no 
acute infiltrates.

 

We will follow.

 

This is a 70-minute critical time, of which 50% in direct patient care.

## 2018-07-01 LAB
ANION GAP SERPL CALC-SCNC: 15 MMOL/L (ref 10–20)
BUN SERPL-MCNC: 15 MG/DL (ref 9.8–20.1)
CALCIUM SERPL-MCNC: 7.6 MG/DL (ref 7.8–10.44)
CHLORIDE SERPL-SCNC: 105 MMOL/L (ref 98–107)
CO2 SERPL-SCNC: 20 MMOL/L (ref 23–31)
CREAT CL PREDICTED SERPL C-G-VRATE: 88 ML/MIN (ref 70–130)
GLUCOSE SERPL-MCNC: 129 MG/DL (ref 83–110)
HGB BLD-MCNC: 13 G/DL (ref 12–16)
MCH RBC QN AUTO: 34.4 PG (ref 27–31)
MCV RBC AUTO: 102 FL (ref 78–98)
MDIFF COMPLETE?: YES
PLATELET # BLD AUTO: 207 THOU/UL (ref 130–400)
PLATELET BLD QL SMEAR: (no result)
POTASSIUM SERPL-SCNC: 3.6 MMOL/L (ref 3.5–5.1)
RBC # BLD AUTO: 3.77 MILL/UL (ref 4.2–5.4)
SODIUM SERPL-SCNC: 136 MMOL/L (ref 136–145)
WBC # BLD AUTO: 23.3 THOU/UL (ref 4.8–10.8)

## 2018-07-01 RX ADMIN — HYDROCODONE BITARTRATE AND ACETAMINOPHEN PRN TAB: 5; 325 TABLET ORAL at 03:46

## 2018-07-01 RX ADMIN — CYANOCOBALAMIN TAB 1000 MCG SCH MCG: 1000 TAB at 08:51

## 2018-07-01 NOTE — PRG
DATE OF SERVICE:  07/01/2018

 

REASON FOR CONSULTATION:  Ulcerative colitis.

 

SUBJECTIVE:  The patient states that she did well last night with no acute events or problems.  She d
oes continue to have some mild lower quadrant abdominal pain that has been present for the last 2 wee
ks since her diagnosis with ulcerative colitis, otherwise she denies any nausea, vomiting, fevers, ch
ills, dysphagia, odynophagia, hematochezia, diarrhea, or constipation.

 

OBJECTIVE:

VITAL SIGNS:  Temperature 97.6, pulse 97, blood pressure 146/91, respiratory rate 16, and satting 97%
 on 2 liters nasal cannula.

GENERAL:  Patient is lying in bed in no acute distress.  Alert and oriented x4.

CARDIOVASCULAR:  Tachycardic rate, but regular rhythm.

RESPIRATORY:  Clear to auscultation bilaterally.

ABDOMEN:  Normoactive bowel sounds, soft, nontender, nondistended.

EXTREMITIES:  No cyanosis, clubbing or edema.

 

LABORATORY DATA:  CBC with a white blood cell count of 23.3, hemoglobin 13, hematocrit 38.4, platelet
s 207.  Chemistry with a sodium of 136, potassium 3.6, chloride 105, CO2 of 20, BUN 15, creatinine 0.
63, and glucose 129.

 

IMAGING DATA:  No current GI imaging is available for review.

 

ASSESSMENT AND PLAN:  The patient is an 83-year-old female with past medical history of ulcerative co
litis, presenting with tachycardia, hypotension and pulmonary emboli consistent with pulmonary emboli
sm and possible sepsis.

 

Ulcerative colitis.

 

The patient was recently diagnosed with ulcerative colitis during her last hospitalization at the end
 of June, characterized by increased mucosal inflammation and ulceration extending from the rectum in
to the mid transverse colon.  She was ultimately placed on oral mesalamine as well as oral steroid th
erapy and discharged her home with plans to follow up in the clinic; however, shortly after her disch
arge, she experienced increased hypotension and tachycardia.  The required significant management inc
luding pressor support.  Imaging findings on admission were consistent with a saddle type pulmonary e
mbolus, which could generate all of the above symptoms; however, with the question of anticoagulation
 needed in relation to her recent diagnosis of ulcerative colitis.  She has had no episodes of hemato
chezia during this admission and the medication should not be held in regards to this.

 

RECOMMENDATIONS:

1.  We would continue patient on mesalamine 2 grams b.i.d.

2.  We would continue to hold IV steroids for now in light of possible infectious etiology and septic
 shock contributing to hypotension and tachycardia.

3.  We would continue with full anticoagulation therapy, enlarged and might have a large pulmonary em
bolism and lack of evidence of GI bleeding.

 

We will continue to follow.  Please call with any questions.

## 2018-07-01 NOTE — PDOC.PN
- Subjective


Encounter Start Date: 07/01/18


Encounter Start Time: 14:58


Subjective: seen and examined with  at bedside


-: reports feeling a little better but pain r ribcage 


-: no SOB/Fever/chills.





- Objective


Resuscitation Status: 


 











Resuscitation Status           FULL:Full Resuscitation














MAR Reviewed: Yes


Vital Signs & Weight: 


 Vital Signs (12 hours)











  Temp Pulse Pulse Pulse Resp BP BP


 


 07/01/18 11:09  97.7 F  111 H     


 


 07/01/18 09:31    100  101 H   143/82 H  134/78


 


 07/01/18 07:37  97.6 F  97    16  


 


 07/01/18 07:22  97.6 F  97    16  


 


 07/01/18 04:00  97.8 F  104 H    20  














  BP Pulse Ox Pulse Ox Pulse Ox


 


 07/01/18 11:09  123/79  97  


 


 07/01/18 09:31    100  100


 


 07/01/18 07:37   97  


 


 07/01/18 07:22  146/91 H  95  


 


 07/01/18 04:00  155/90 H  97  








 Weight











Weight                         182 lb














I&O: 


 











 06/30/18 07/01/18 07/02/18





 06:59 06:59 06:59


 


Intake Total 1840 2400 


 


Output Total 350 2075 


 


Balance 1490 325 











Result Diagrams: 


 07/01/18 00:59





 07/01/18 00:59


Additional Labs: 





Microbiology





06/29/18 13:48   Urine holm catheter   Urine Culture - Final


                              NO GROWTH AT 48 HOURS


06/29/18 13:58   Venous blood - Right Hand   Blood Culture - Preliminary


                              NO GROWTH AT 48 HOURS


06/29/18 13:44   Venous blood - Left Hand   Blood Culture - Preliminary


                              NO GROWTH AT 48 HOURS





 Laboratory Tests











  06/29/18 06/29/18 06/29/18





  13:58 13:58 19:03


 


Lactic Acid  4.1 H*   3.0 H


 


Troponin I   0.319 H* 


 


B-Natriuretic Peptide   














  06/29/18 06/29/18 06/30/18





  19:04 21:56 10:08


 


Lactic Acid   


 


Troponin I  0.327 H*  0.309 H*  0.176 H


 


B-Natriuretic Peptide   














  06/30/18





  10:08


 


Lactic Acid 


 


Troponin I 


 


B-Natriuretic Peptide  1110.3 H








labs reviewed





Phys Exam





- Physical Examination


Constitutional: NAD


pale and weak looking


HEENT: PERRLA, moist MMs, sclera anicteric, oral pharynx no lesions


Neck: no nodes, no JVD, supple, full ROM


Respiratory: no wheezing, no rales, no rhonchi, clear to auscultation bilateral


Cardiovascular: no significant murmur, irregular


Gastrointestinal: soft, non-tender, no distention, positive bowel sounds


Musculoskeletal: pulses present, edema present


Neurological: non-focal, normal sensation, moves all 4 limbs


Psychiatric: normal affect, A&O x 3


Skin: no rash





Dx/Plan


(1) Bilateral pulmonary embolism


Code(s): I26.99 - OTHER PULMONARY EMBOLISM WITHOUT ACUTE COR PULMONALE   Status

: Acute   Comment: on full anticoagulation   





(2) Left leg DVT


Code(s): I82.402 - ACUTE EMBOLISM AND THOMBOS UNSP DEEP VEINS OF L LOW EXTREM   

Status: Acute   


Qualifiers: 


   Chronicity: acute 





(3) Atrial fibrillation with RVR


Code(s): I48.91 - UNSPECIFIED ATRIAL FIBRILLATION   Status: Acute   Comment: 

rate controlled on Amiodarone   





(4) Troponin level elevated


Code(s): R74.8 - ABNORMAL LEVELS OF OTHER SERUM ENZYMES   Status: Acute   

Comment: Likley demand ischemia from PE and a-fib w RVR. troponin trending down

   





(5) Acute diastolic CHF (congestive heart failure)


Code(s): I50.31 - ACUTE DIASTOLIC (CONGESTIVE) HEART FAILURE   Status: Acute   

Comment: BNP>1000   





(6) Ulcerative colitis, acute


Code(s): K51.90 - ULCERATIVE COLITIS, UNSPECIFIED, WITHOUT COMPLICATIONS   

Status: Acute   Comment: On  Pentasa. Steroids stopped due to question of 

infection   





(7) HLD (hyperlipidemia)


Code(s): E78.5 - HYPERLIPIDEMIA, UNSPECIFIED   Status: Chronic   





(8) Physical deconditioning


Code(s): R53.81 - OTHER MALAISE   Status: Chronic   





- Plan


continue antibiotics, PT/OT, respiratory therapy, incentive spirometry, out of 

bed/ambulate, DVT proph w/SCDs


Cont anticoagulation.changed to Lovenox by PCCM


-: monitor for bleeding.H/H daily.


-: cont amiodarone.


-: will give 1 more doase of lasix today & re assess tomorrow.


-: cont pentasa.steroids on hold per GI





* .doubt infectious etiology for hypotension on presentation


* suspect cardiogenic given a-fib w RVR and Ac CHF.


* am labs


* encourage OOB .








Review of Systems





- Review of Systems


Constitutional: weakness, malaise.  negative: fever, chills, sweats, other


ENT: negative: Ear Pain, Ear Discharge, Nose Pain, Nose Discharge, Nose 

Congestion, Mouth Pain, Mouth Swelling, Throat Pain, Throat Swelling, Other


Respiratory: negative: Cough, Dry, Shortness of Breath, Hemoptysis, SOB with 

Excertion, Pleuritic Pain, Sputum, Wheezing


Cardiovascular: negative: chest pain, palpitations, orthopnea, paroxysmal 

nocturnal dyspnea, edema, light headedness, other


Gastrointestinal: negative: Nausea, Vomiting, Abdominal Pain, Diarrhea, 

Constipation, Melena, Hematochezia, Other


Genitourinary: negative: Dysuria, Frequency, Incontinence, Hematuria, Retention

, Other


Musculoskeletal: Other.  negative: Neck Pain, Shoulder Pain, Arm Pain, Back Pain

, Hand Pain, Leg Pain, Foot Pain





- Medications/Allergies


Allergies/Adverse Reactions: 


 Allergies











Allergy/AdvReac Type Severity Reaction Status Date / Time


 


clindamycin [From Cleocin] Allergy   Verified 06/18/18 13:44


 


levofloxacin Allergy   Verified 06/18/18 13:44


 


Penicillins Allergy   Verified 06/18/18 13:44


 


polyethylene glycol Allergy   Verified 06/18/18 13:44





[From Colyte]     


 


polyethylene glycol 3350 Allergy   Verified 06/18/18 13:44





[From Colyte]     


 


potassium chloride Allergy   Verified 06/18/18 13:44





[From Colyte]     


 


sodium [From Colyte] Allergy   Verified 06/18/18 13:44


 


sodium bicarbonate Allergy   Verified 06/18/18 13:44





[From Colyte]     


 


sodium chloride [From Colyte] Allergy   Verified 06/18/18 13:44


 


sodium sulfate [From Colyte] Allergy   Verified 06/18/18 13:44


 


Sulfa (Sulfonamide Allergy   Verified 06/18/18 13:44





Antibiotics)     











Medications: 


 Current Medications





Acetaminophen (Tylenol)  650 mg PO Q4H PRN


   PRN Reason: Headache/Fever or Pain


   Last Admin: 06/29/18 17:42 Dose:  650 mg


Hydrocodone Bitart/Acetaminophen (Norco 5/325)  1 tab PO Q4H PRN


   PRN Reason: Moderate Pain (4-6)


   Last Admin: 07/01/18 03:46 Dose:  1 tab


Al Hydroxide/Mg Hydroxide (Maalox)  30 ml PO Q6H PRN


   PRN Reason: Heartburn  or Indigestion


Artificial Tears (Tears Naturale)  0 drop EA EYE PRN PRN


   PRN Reason: Dry Eyes


Atorvastatin Calcium (Lipitor)  40 mg PO HS [facility]


   Last Admin: 06/30/18 21:55 Dose:  40 mg


Cyanocobalamin (Vitamin B-12)  1,000 mcg PO DAILY [facility]


   Last Admin: 07/01/18 08:51 Dose:  1,000 mcg


Dronedarone (Multaq)  400 mg PO BID-[facility]


   Last Admin: 07/01/18 08:51 Dose:  400 mg


Enoxaparin Sodium (Lovenox)  80 mg SC 0900,2100 [facility]


   Last Admin: 07/01/18 11:16 Dose:  80 mg


Folic Acid (Folvite)  1 mg PO DAILY [facility]


   Last Admin: 07/01/18 08:51 Dose:  1 mg


Guaifenesin (Robitussin Sf)  200 mg PO Q4H PRN


   PRN Reason: Cough


Heparin Sodium (Porcine) (Heparin 1,000 Units/Ml (10 Ml))  0 units SLOW IVP 

ASDIR [facility]


   PRN Reason: Protocol


Cefepime HCl 1 gm/ Sodium (Chloride)  100 mls @ 200 mls/hr IVPB 1000,2200 [facility]


   Last Admin: 07/01/18 08:53 Dose:  100 mls


Loperamide HCl (Imodium)  2 mg PO PRN PRN


   PRN Reason: Diarrhea/Loose Stools


Loratadine (Claritin)  10 mg PO DAILYPRN PRN


   PRN Reason: Sinus Symptoms


Magnesium Hydroxide (Milk Of Magnesium)  30 ml PO DAILYPRN PRN


   PRN Reason: Constipation


Melatonin (Melatonin)  9 mg PO [facility]


   Last Admin: 06/30/18 21:55 Dose:  9 mg


Mesalamine (Pentasa)  2,000 mg PO BID [facility]


   Last Admin: 07/01/18 08:52 Dose:  2,000 mg


Mineral Oil/White Petrolatum (Eucerin Cream)  0 gm TOP BIDPRN PRN


   PRN Reason: Dry Skin


Ondansetron HCl (Zofran Odt)  4 mg PO Q6H PRN


   PRN Reason: Nausea/Vomiting


Ondansetron HCl (Zofran)  4 mg IVP Q6H PRN


   PRN Reason: Nausea/Vomiting


   Last Admin: 06/29/18 20:28 Dose:  4 mg


Pantoprazole Sodium (Protonix)  40 mg IVP Q12HR [facility]


   Last Admin: 07/01/18 08:52 Dose:  40 mg


Phenol (Chloraseptic Spray 180 Ml Bot)  0 ml PO PRN PRN


   PRN Reason: Sore Throat


Senna (Senokot)  2 tab PO HSPRN PRN


   PRN Reason: Constipation


Sodium Chloride (Ocean Nasal Spray 0.65%)  0 ml EA NARE QIDPRN PRN


   PRN Reason: Nasal Congestion


Zolpidem Tartrate (Ambien)  5 mg PO HSPRN PRN


   PRN Reason: Insomnia

## 2018-07-01 NOTE — PDOC.EVN
Event Note





- Event Note


Event Note: 





Late entry-





Called on to family conference by daughter regarding updating the extended 

family visiting from out of town





care discussed in extensive deals with 2 sisters,Brother in law ,daughter and 

granddaughter.


All Qs answered.


Discussed present illness, all diagnoses and ongoing treatment. discussed risk 

and benefits of each.Results of various tests made available as well.discussed 

prognosis with regards to each diagnosis including A-fib,CHF, Colitis, 

Pulmonary embolism and DVT.


 discussed that pt is high risk for decompensation .


They wanted me to address other Physicians's opinion for the pt. Gently re 

directed them to the respective physicians.Answered Qs to the best of my 

knowledge for this pt.


Code status brought up and even though they are not MPOA, they expressed wishes 

to get everything done to get their family member  to get better.


Assurance and emotional support also provided.


they declined the need to talk to Palliative care at this time .will be 

available to answer any future Qs





Total time spent in family conference -36 minutes.

## 2018-07-01 NOTE — PRG
DATE OF SERVICE:  07/01/2018

 

SUBJECTIVE:  This morning, she is better.  She is less short of breath.

 

OBJECTIVE:

VITAL SIGNS:  Sats 97 on 2 liters, temperature 97, blood pressure 146/91.

EXTREMITIES:  2+ edema, ecchymosis.

CHEST:  Decreased breath sounds without any wheezing.

CARDIAC:  Normal S1, S2.

ABDOMEN:  Soft, no masses.

 

LABORATORY DATA:  White count 83352, H and H 13 and 38, platelet count 207.  Kidney function normal.

 

BNP is 1110 and thyroid function was normal.

 

IMPRESSION:

1.  Colitis.

2.  Pulmonary embolism/deep venous thrombosis.

3.  No evidence of gastrointestinal bleed.

4.  Leukocytosis.

 

PLAN:  We will switch over to Lovenox for the next 24-48 hours.  If no further bleeding,  switch over
 to Eliquis.

 

A 6 months anticoagulation.  Continue antibiotics.  Continue PT and supportive care.

 

We will follow.

## 2018-07-02 LAB
ANION GAP SERPL CALC-SCNC: 9 MMOL/L (ref 10–20)
BUN SERPL-MCNC: 14 MG/DL (ref 9.8–20.1)
CALCIUM SERPL-MCNC: 7.6 MG/DL (ref 7.8–10.44)
CHLORIDE SERPL-SCNC: 103 MMOL/L (ref 98–107)
CO2 SERPL-SCNC: 29 MMOL/L (ref 23–31)
CREAT CL PREDICTED SERPL C-G-VRATE: 103 ML/MIN (ref 70–130)
GLUCOSE SERPL-MCNC: 91 MG/DL (ref 83–110)
HGB BLD-MCNC: 13.2 G/DL (ref 12–16)
MCH RBC QN AUTO: 34.2 PG (ref 27–31)
MCV RBC AUTO: 101 FL (ref 78–98)
MDIFF COMPLETE?: YES
PLATELET # BLD AUTO: 205 THOU/UL (ref 130–400)
PLATELET BLD QL SMEAR: (no result)
POTASSIUM SERPL-SCNC: 3.1 MMOL/L (ref 3.5–5.1)
RBC # BLD AUTO: 3.85 MILL/UL (ref 4.2–5.4)
SODIUM SERPL-SCNC: 138 MMOL/L (ref 136–145)
WBC # BLD AUTO: 17.5 THOU/UL (ref 4.8–10.8)

## 2018-07-02 RX ADMIN — HYDROCODONE BITARTRATE AND ACETAMINOPHEN PRN TAB: 5; 325 TABLET ORAL at 11:42

## 2018-07-02 RX ADMIN — CYANOCOBALAMIN TAB 1000 MCG SCH MCG: 1000 TAB at 08:39

## 2018-07-02 RX ADMIN — POTASSIUM CHLORIDE SCH MEQ: 14.9 INJECTION, SOLUTION INTRAVENOUS at 11:29

## 2018-07-02 RX ADMIN — HYDROCODONE BITARTRATE AND ACETAMINOPHEN PRN TAB: 5; 325 TABLET ORAL at 06:53

## 2018-07-02 RX ADMIN — HYDROCODONE BITARTRATE AND ACETAMINOPHEN PRN TAB: 5; 325 TABLET ORAL at 23:54

## 2018-07-02 RX ADMIN — Medication SCH ML: at 20:53

## 2018-07-02 RX ADMIN — POTASSIUM CHLORIDE SCH: 14.9 INJECTION, SOLUTION INTRAVENOUS at 14:15

## 2018-07-02 NOTE — PDOC.PN
- Subjective


Encounter Start Date: 07/02/18


Encounter Start Time: 14:39





- Objective


Resuscitation Status: 


 











Resuscitation Status           FULL:Full Resuscitation














MAR Reviewed: Yes


Vital Signs & Weight: 


 Vital Signs (12 hours)











  Temp Pulse Pulse Pulse Pulse Resp BP


 


 07/02/18 11:24  98.0 F  106 H     19 


 


 07/02/18 10:58    104 H  101 H    147/68 H


 


 07/02/18 08:58    97  100  106 H   131/82


 


 07/02/18 08:10  97.8 F  95     


 


 07/02/18 08:00  97.8 F  95     20 


 


 07/02/18 03:56  98.0 F  100     20 














  BP BP BP Pulse Ox Pulse Ox Pulse Ox Pulse Ox


 


 07/02/18 11:24    135/77  97   


 


 07/02/18 10:58  134/77     96  967 H 


 


 07/02/18 08:58  164/99 H  113/90    97  96  100


 


 07/02/18 08:10    139/75  95   


 


 07/02/18 08:00     98   


 


 07/02/18 03:56    118/69  96   








 Weight











Weight                         185 lb














I&O: 


 











 07/01/18 07/02/18 07/03/18





 06:59 06:59 06:59


 


Intake Total 2400 1800 


 


Output Total 2075 2300 


 


Balance 325 -500 











Result Diagrams: 


 07/02/18 04:27





 07/02/18 04:27


Additional Labs: 





Microbiology





06/29/18 13:48   Urine holm catheter   Urine Culture - Final


                              NO GROWTH AT 48 HOURS


06/29/18 13:58   Venous blood - Right Hand   Blood Culture - Preliminary


                              NO GROWTH AT 48 HOURS


06/29/18 13:44   Venous blood - Left Hand   Blood Culture - Preliminary


                              NO GROWTH AT 48 HOURS





labs reviewed


Radiology Reviewed by me: Yes (CXR- no rib fracture right side)





Phys Exam





- Physical Examination


Constitutional: NAD


HEENT: PERRLA, moist MMs, sclera anicteric, oral pharynx no lesions


Neck: no nodes, no JVD, supple, full ROM


Respiratory: no wheezing, no rales, no rhonchi, clear to auscultation bilateral


Cardiovascular: RRR, no significant murmur, no rub


Gastrointestinal: soft, non-tender, no distention, positive bowel sounds


Musculoskeletal: no edema, pulses present


Neurological: non-focal, normal sensation, moves all 4 limbs


Psychiatric: normal affect, A&O x 3


Skin: no rash





Dx/Plan


(1) Bilateral pulmonary embolism


Code(s): I26.99 - OTHER PULMONARY EMBOLISM WITHOUT ACUTE COR PULMONALE   Status

: Acute   Comment: on full anticoagulation   





(2) Left leg DVT


Code(s): I82.402 - ACUTE EMBOLISM AND THOMBOS UNSP DEEP VEINS OF L LOW EXTREM   

Status: Acute   


Qualifiers: 


   Chronicity: acute 





(3) Atrial fibrillation with RVR


Code(s): I48.91 - UNSPECIFIED ATRIAL FIBRILLATION   Status: Acute   Comment: 

rate controlled on Amiodarone.NSR now   





(4) Troponin level elevated


Code(s): R74.8 - ABNORMAL LEVELS OF OTHER SERUM ENZYMES   Status: Acute   

Comment: Likley demand ischemia from PE and a-fib w RVR. troponin trending down

   





(5) Acute diastolic CHF (congestive heart failure)


Code(s): I50.31 - ACUTE DIASTOLIC (CONGESTIVE) HEART FAILURE   Status: Resolved

   Comment: BNP>1000   





(6) Ulcerative colitis, acute


Code(s): K51.90 - ULCERATIVE COLITIS, UNSPECIFIED, WITHOUT COMPLICATIONS   

Status: Acute   Comment: On  Pentasa. Steroids stopped due to question of 

infection   





(7) HLD (hyperlipidemia)


Code(s): E78.5 - HYPERLIPIDEMIA, UNSPECIFIED   Status: Chronic   





(8) Physical deconditioning


Code(s): R53.81 - OTHER MALAISE   Status: Chronic   





- Plan


plan discussed w/ family, PT/OT, respiratory therapy, incentive spirometry, out 

of bed/ambulate, DVT proph w/lovenox, DVT proph w/SCDs


H/h stable. no overt bleed. cont lovenox BID for PE.


-: cont empiric ABx though no evidence of infection.Cx all negative so far


-: encourage OOB .


-: NSR now. cont amiodarone


-: monitor I/os. Monitor renal Fx





* .


improving-luis enrique due to recent IV steroids.


Cont Pentasa. 


am labs





Review of Systems





- Review of Systems


Constitutional: weakness, malaise


Respiratory: negative: Cough, Dry, Shortness of Breath, Hemoptysis, SOB with 

Excertion, Pleuritic Pain, Sputum, Wheezing


Cardiovascular: negative: chest pain, palpitations, orthopnea, paroxysmal 

nocturnal dyspnea, edema, light headedness, other


Gastrointestinal: negative: Nausea, Vomiting, Abdominal Pain, Diarrhea, 

Constipation, Melena, Hematochezia, Other


Genitourinary: negative: Dysuria, Frequency, Incontinence, Hematuria, Retention

, Other


Musculoskeletal: negative: Neck Pain, Shoulder Pain, Arm Pain, Back Pain, Hand 

Pain, Leg Pain, Foot Pain, Other


Skin: negative: Rash, Lesions, Sorin, Bruising, Other





- Medications/Allergies


Allergies/Adverse Reactions: 


 Allergies











Allergy/AdvReac Type Severity Reaction Status Date / Time


 


clindamycin [From Cleocin] Allergy   Verified 06/18/18 13:44


 


levofloxacin Allergy   Verified 06/18/18 13:44


 


Penicillins Allergy   Verified 06/18/18 13:44


 


polyethylene glycol Allergy   Verified 06/18/18 13:44





[From Colyte]     


 


polyethylene glycol 3350 Allergy   Verified 06/18/18 13:44





[From Colyte]     


 


potassium chloride Allergy   Verified 06/18/18 13:44





[From Colyte]     


 


sodium [From Colyte] Allergy   Verified 06/18/18 13:44


 


sodium bicarbonate Allergy   Verified 06/18/18 13:44





[From Colyte]     


 


sodium chloride [From Colyte] Allergy   Verified 06/18/18 13:44


 


sodium sulfate [From Colyte] Allergy   Verified 06/18/18 13:44


 


Sulfa (Sulfonamide Allergy   Verified 06/18/18 13:44





Antibiotics)     











Medications: 


 Current Medications





Acetaminophen (Tylenol)  650 mg PO Q4H PRN


   PRN Reason: Headache/Fever or Pain


   Last Admin: 06/29/18 17:42 Dose:  650 mg


Hydrocodone Bitart/Acetaminophen (Norco 5/325)  1 tab PO Q4H PRN


   PRN Reason: Moderate Pain (4-6)


   Last Admin: 07/02/18 11:42 Dose:  1 tab


Al Hydroxide/Mg Hydroxide (Maalox)  30 ml PO Q6H PRN


   PRN Reason: Heartburn  or Indigestion


Artificial Tears (Tears Naturale)  0 drop EA EYE PRN PRN


   PRN Reason: Dry Eyes


Atorvastatin Calcium (Lipitor)  40 mg PO HS [facility]


   Last Admin: 07/01/18 21:17 Dose:  40 mg


Cyanocobalamin (Vitamin B-12)  1,000 mcg PO DAILY [facility]


   Last Admin: 07/02/18 08:39 Dose:  1,000 mcg


Dronedarone (Multaq)  400 mg PO BID-[facility]


   Last Admin: 07/02/18 08:39 Dose:  400 mg


Enoxaparin Sodium (Lovenox)  80 mg SC 0900,2100 [facility]


   Last Admin: 07/02/18 08:39 Dose:  80 mg


Folic Acid (Folvite)  1 mg PO DAILY [facility]


   Last Admin: 07/02/18 08:39 Dose:  1 mg


Guaifenesin (Robitussin Sf)  200 mg PO Q4H PRN


   PRN Reason: Cough


Heparin Sodium (Porcine) (Heparin 1,000 Units/Ml (10 Ml))  0 units SLOW IVP 

ASDIR [facility]


   PRN Reason: Protocol


Cefepime HCl 1 gm/ Sodium (Chloride)  100 mls @ 200 mls/hr IVPB 1000,2200 [facility]


   Last Admin: 07/02/18 11:29 Dose:  100 mls


Loperamide HCl (Imodium)  2 mg PO PRN PRN


   PRN Reason: Diarrhea/Loose Stools


Loratadine (Claritin)  10 mg PO DAILYPRN PRN


   PRN Reason: Sinus Symptoms


Magnesium Hydroxide (Milk Of Magnesium)  30 ml PO DAILYPRN PRN


   PRN Reason: Constipation


Melatonin (Melatonin)  9 mg PO HS [facility]


   Last Admin: 07/01/18 22:05 Dose:  9 mg


Mesalamine (Pentasa)  2,000 mg PO BID [facility]


   Last Admin: 07/02/18 08:39 Dose:  2,000 mg


Mineral Oil/White Petrolatum (Eucerin Cream)  0 gm TOP BIDPRN PRN


   PRN Reason: Dry Skin


Ondansetron HCl (Zofran Odt)  4 mg PO Q6H PRN


   PRN Reason: Nausea/Vomiting


Ondansetron HCl (Zofran)  4 mg IVP Q6H PRN


   PRN Reason: Nausea/Vomiting


   Last Admin: 06/29/18 20:28 Dose:  4 mg


Pantoprazole Sodium (Protonix)  40 mg IVP Q12HR [facility]


   Last Admin: 07/02/18 08:39 Dose:  40 mg


Phenol (Chloraseptic Spray 180 Ml Bot)  0 ml PO PRN PRN


   PRN Reason: Sore Throat


Potassium Chloride (K-Dur)  40 meq PO ONE [facility]


   Stop: 07/02/18 16:00


Senna (Senokot)  2 tab PO HSPRN PRN


   PRN Reason: Constipation


Sodium Chloride (Ocean Nasal Spray 0.65%)  0 ml EA NARE QIDPRN PRN


   PRN Reason: Nasal Congestion


Sodium Chloride (Flush - Normal Saline)  10 ml IVF Q12HR [facility]


Sodium Chloride (Flush - Normal Saline)  10 ml IVF PRN PRN


   PRN Reason: Saline Flush


Zolpidem Tartrate (Ambien)  5 mg PO HSPRN PRN


   PRN Reason: Insomnia

## 2018-07-02 NOTE — RAD
ABDOMEN ONE VIEW:

 

HISTORY:

Ileus.

 

COMPARISON:

None.

 

FINDINGS:

There is a dilated loop of bowel in the left lower quadrant of the abdomen, which may represent the t
ransverse colon.  There are phleboliths in the pelvis.  Evaluation of free air is limited without an 
upright examination.

 

Moderate levoscoliosis of the lumbar spine.

 

IMPRESSION:

Single dilated loop of bowel in the left lower quadrant of the abdomen, which may reflect the transve
rse colon.  This may be sequela of the patient's underlying known colitis.

 

POS: SUZANNE

## 2018-07-02 NOTE — PRG
DATE OF SERVICE:  07/02/2018

 

This morning she is still complaining of right-sided chest pain, worse with deep breathing and coughi
ng, but no shortness of breath.  

 

PHYSICAL EXAMINATION: 

VITAL SIGNS:  Sats 100% on room air, temperature 97, blood pressure is 139/75.

CHEST:  Chest reveals decreased breath sounds, no wheezing.

CARDIAC:  Normal S1, S2, no gallops.

ABDOMEN:  Soft.  No masses.

 

LABORATORY:  White count 17,000, H&H 13 and 38, platelet count is normal, 69 segs, 6 bands.  Electrol
ytes are normal.

 

IMPRESSION:

1.  Pulmonary emboli.  

2.  Deep venous thrombosis.

3.  Right-sided chest pain.

4.  Leukocytosis.

5.  Possibly pneumonia.

6.  Fractured rib.  

 

PLAN:  Continue antibiotics.  Continue Lovenox.

 

Consider switching over to Eliquis in the next 24-48 hours.

## 2018-07-02 NOTE — PQF
DATE:     7-2-18                                                        ATTN:   
DR. CHLOE DUNNE



Please exercise your independent, professional judgment in responding to the 
clarification form. 

Clinical indicators are provided on the bottom of this form for your review



Please check appropriate box(es):

[  ] Sepsis due to: (Acute Ulcerative Colitis, etc.) __________________________
_              

[  ] Severe sepsis with acute organ dysfunction of: ____________________________
_______

        (Examples: respiratory failure,  other)

[  ] Septic Shock

[  ] Other diagnosis ___________

[X  ] Unable to determine



In addition, please specify:

Present on Admission (POA):  [  ] Yes             [  ] No             [X  ] 
Unable to determine



For continuity of documentation, please document condition throughout progress 
notes and discharge summary.  Thank You.



CLINICAL INDICATORS - SIGNS / SYMPTOMS / LABS



ER DIAGNOSIS:   SEVERE SEPSIS,  HX OF COLITIS, PSVT



H&P:   THE PATIENT HAD LEUKOCYTOSIS AND PATIENT WAS DIAGNOSED AS SEPTIC SHOCK.  
OTHER 

           POSSIBILITY IS SEPSIS GIVEN LEUCOCYTOSIS, BUT CAN BE EXPLAINED BY 
RECENT USE OF STEROID.  

          SEPSIS IS STILL CONSIDERATION AND THAT IS WHY WE WILL CONSIDER BROAD 
SPECTRUM ANTIBIOTIC

          ANTIBIOTIC THERAPY WITH MEROPENEM.



CONSULT NOTE DR. CHAVES 6-30-18:   POSSIBLE SEPSIS WITH ELEVATED WHITE COUNT, 
HYPOTENSION



CONSULT NOTE DR. ROSS 7-1-18:   WE WOULD CONTINUE TO HOLD IV STEROIDS FOR NOW 
IN LIGHT OF POSSIBLE

             INFECTIOUS ETIOLOGY AND SEPTIC SHOCK CONTRIBUTING TO HYPOTENSION 
AND TACHYCARDIA



PULSE:   6-29-18:    109, 109,  110,  104,  170,  126

               6-30-18:    106, 103,  106,  102,  

               7-1-18:       111,  112,  112



RR:  ER:  30,  25,   25



WBC:   6-29-18:   29.1,  23.0

            6-30-18:   21.5

            7-1-18:   23.3

            7-2-18:   17.5



BANDS:   6-29-18:   15,  37



LACTIC ACID:   6-29-18:   4.1,   3.0



RISK FACTORS:   ER:   AIR MED AS TRANSFER FROM [facility] FOR SEPTIC 
SHOCK, 

             HYPOTENSIVE WITH TACHYCARDIA, PT WAS ADMITTED FOR ULCERATIVE 
COLITIS AND D/C

             FROM [facility] YESTERDAY. LACTATE 5.6.



                           ADVANCED AGE



TREATMENTS:  SEPSIS IS STILL CONSIDERATION AND THAT IS WHY WE WILL CONSIDER 
BROAD SPECTRUM ANTIBIOTIC

                             ANTIBIOTIC THERAPY WITH MEROPENEM. 



                          MAR:   MAXIPIME IV

                          ER:  IVF



(This form is maintained as a part of the permanent medical record)

 2015 [employer].  All Rights Reserved

RADHA [email or web address]    Office:  [phone]

                                                              

 

[facility]

## 2018-07-02 NOTE — PQF
DATE:     7-2-18                                                             
ATTN:  DR. CHLOE DUNNE



Please exercise your independent, professional judgment in responding to the 
clarification form. 

Clinical indicators are provided on the bottom of this form for your review



Please check appropriate box(s):

[  ] NSTEMI  

[  ] AMI Type II

[ X ] Demand Ischemia 

[  ] Other diagnosis ___________

[  ] Unable to determine



In addition, please specify:

Present on Admission (POA):  [  X] Yes             [  ] No             [  ] 
Unable to determine





CLINICAL INDICATORS - SIGNS / SYMPTOMS / LABS



ER DX:   SEVERE SEPSIS, HX OF COLITIS, PSVT



H&P:   A FIB WITH RVR, ATRIAL TACHYARRHYTHMIA,  WILL CONSULT CARDIOLOGY,  NON-ST
-ELEVATION MI, 

          TYPE 2 DEMAND ISCHEMIA 2/2 TACHYCARDIA AND HYPOPERFUSION. WE WILL DO 
SERIAL CE X3. ECHO, 

          PATIENT WILL BE GIVEN ASPIRIN.



PN 7-1-18:   TROPONIN ELEVATED, LIKELY DEMAND ISCHEMIA FROM PE AND A FIB



TROPONIN:   6-29-18:   0.319,  0.327, 0.309,  0.176



RISKS:  H&P:   A FIB WITH RVR, ATRIAL TACHYARRHYTHMIA,  WILL CONSULT CARDIOLOGY
,  NON-ST-ELEVATION MI,

         TYPE 2 DEMAND ISCHEMIA 2/2 TACHYCARDIA AND HYPOPERFUSION. WE WILL DO 
SERIAL CE X3. ECHO, 

         PATIENT WILL BE GIVEN ASPIRIN.



TREATMENTS:H&P:   A FIB WITH RVR, ATRIAL TACHYARRHYTHMIA,  WILL CONSULT 
CARDIOLOGY,  NON-ST-ELEVATION MI, 

        TYPE 2 DEMAND ISCHEMIA 2/2 TACHYCARDIA AND HYPOPERFUSION. WE WILL DO 
SERIAL CE X3. ECHO,

        PATIENT WILL BE GIVEN ASPIRIN.



                              CARDIAC MONITORING



(This form is maintained as a part of the permanent medical record)

 2015 [employer].  All Rights Reserved

RADHA [email or web address]    Office:  [phone]

                                                              

[facility]

## 2018-07-02 NOTE — RAD
CHEST ONE VIEW:

 

Comparison: 6-29-18

 

History: Right sided pain. Rule out rib fracture. 

 

FINDINGS: 

There are small bilateral pleural effusions. Chronic change of the lung parenchyma. No pneumothorax. 
Stable cardiac silhouette. No osseous abnormalities. 

 

IMPRESSION: 

1. Small bilateral pleural effusions. 

2. No radiographic evidence of a right sided rib fracture. Consider dedicated right rib radiograph se
stephen for further evaluation.

 

 

POS: SUZANNE

## 2018-07-02 NOTE — PRG
DATE OF SERVICE:  07/02/2018

 

SUBJECTIVE:  Ms. Garcia was readmitted with pulmonary embolism.  She is doing well from a colon st
andpoint.  She has had no further rectal bleeding.  Her bowel movements have been formed around 4 sayra
es per day.  She has no abdominal pain.  She does complain of pain in her right ribs after a recent f
all.

 

PHYSICAL EXAMINATION:

VITAL SIGNS:  Temperature 98.7, pulse 106, oxygen saturation 97%, and blood pressure 135/77.

GENERAL:  She is in no acute distress.  She is awake and alert, oriented x3.

LUNGS:  Clear to auscultation bilaterally.

HEART:  Tachycardic, S1 and S2.

ABDOMEN:  Soft, nontender, nondistended.  Bowel sounds are present.

EXTREMITIES:  1+ lower extremity edema.

 

IMPRESSION:

1.  Deep vein thrombosis and pulmonary embolism on enoxaparin.

2.  New diagnosis of ulcerative colitis from the rectum to the transverse colon.  She was started on 
IV steroids and transitioned to prednisone during her last hospitalization; however, she has not rest
arted the steroids yet.  She is clinically doing well on mesalamine, currently at a dose of 2000 mg t
wice daily.

 

RECOMMENDATIONS:

1.  Restart prednisone taper.

2.  Continue mesalamine.

3.  Depending on her clinical course, she will most likely need to start chronic immune suppression. 
 I would lean towards Entyvio for her.  She had been on budesonide chronically over the last 3 months
 given by GI in [place] for history of microscopic colitis.  However, this is a new diagnosis of chronic
 ulcerative colitis.  Ultimately, she failed to respond adequately to the budesonide and that is why 
I would expect that she will require more aggressive chronic immune suppression after completing the 
steroids.  We will see how she does with the mesalamine as well.

## 2018-07-03 LAB
ANION GAP SERPL CALC-SCNC: 11 MMOL/L (ref 10–20)
BASOPHILS # BLD AUTO: 0 THOU/UL (ref 0–0.2)
BASOPHILS NFR BLD AUTO: 0.2 % (ref 0–1)
BUN SERPL-MCNC: 12 MG/DL (ref 9.8–20.1)
CALCIUM SERPL-MCNC: 7.6 MG/DL (ref 7.8–10.44)
CHLORIDE SERPL-SCNC: 102 MMOL/L (ref 98–107)
CO2 SERPL-SCNC: 26 MMOL/L (ref 23–31)
CREAT CL PREDICTED SERPL C-G-VRATE: 118 ML/MIN (ref 70–130)
EOSINOPHIL # BLD AUTO: 0.3 THOU/UL (ref 0–0.7)
EOSINOPHIL NFR BLD AUTO: 1.9 % (ref 0–10)
GLUCOSE SERPL-MCNC: 128 MG/DL (ref 83–110)
HGB BLD-MCNC: 12.7 G/DL (ref 12–16)
LYMPHOCYTES # BLD: 1.3 THOU/UL (ref 1.2–3.4)
LYMPHOCYTES NFR BLD AUTO: 8.5 % (ref 21–51)
MCH RBC QN AUTO: 33.7 PG (ref 27–31)
MCV RBC AUTO: 103 FL (ref 78–98)
MONOCYTES # BLD AUTO: 0.3 THOU/UL (ref 0.11–0.59)
MONOCYTES NFR BLD AUTO: 1.9 % (ref 0–10)
NEUTROPHILS # BLD AUTO: 13.7 THOU/UL (ref 1.4–6.5)
NEUTROPHILS NFR BLD AUTO: 87.5 % (ref 42–75)
PLATELET # BLD AUTO: 187 THOU/UL (ref 130–400)
POTASSIUM SERPL-SCNC: 3.4 MMOL/L (ref 3.5–5.1)
RBC # BLD AUTO: 3.78 MILL/UL (ref 4.2–5.4)
SODIUM SERPL-SCNC: 136 MMOL/L (ref 136–145)
WBC # BLD AUTO: 15.6 THOU/UL (ref 4.8–10.8)

## 2018-07-03 RX ADMIN — Medication SCH ML: at 20:54

## 2018-07-03 RX ADMIN — CYANOCOBALAMIN TAB 1000 MCG SCH MCG: 1000 TAB at 08:09

## 2018-07-03 RX ADMIN — Medication SCH ML: at 08:11

## 2018-07-03 NOTE — PRG
DATE OF SERVICE:  07/03/2018

 

She is  better.  She is weak. 

 

PHYSICAL EXAMINATION: 

VITAL SIGNS:  Her sats are 95% on room air, respiration rate 18, temperature 98, blood pressure 150/7
0.

CHEST:  Chest reveals decreased breath sounds without any wheezing.

CARDIAC:  Normal S1, S2, no gallops.  

ABDOMEN:  Soft, no masses.  

 

Chest x-ray yesterday shows pleural effusion, no fractured rib.  

 

White count 15,000, H&H 12 and 38, platelet count is 187.

 

IMPRESSION:

1.  Colitis.

2.  Pulmonary embolus. 

3.  Severe deconditioning.

4.  Stasis edema.

 

PLAN:  Could probably discontinue antibiotics as all cultures negative.  I will switch her over to [place] tomorrow.  Continue PT and supportive care.

 

I will follow.

## 2018-07-03 NOTE — PDOC.PN
- Subjective


Encounter Start Date: 07/03/18


Encounter Start Time: 14:36


Subjective: feels better.able to sit up in chair .eating a little better


-: incontinent of stools





- Objective


Resuscitation Status: 


 











Resuscitation Status           FULL:Full Resuscitation














MAR Reviewed: Yes


Vital Signs & Weight: 


 Vital Signs (12 hours)











  Temp Pulse Pulse Pulse Resp BP BP


 


 07/03/18 13:48    96  89   157/85 H  141/78 H


 


 07/03/18 11:34  98.8 F  84    18  


 


 07/03/18 08:00  98.4 F  88    18  


 


 07/03/18 07:37  98.4 F  88    18  


 


 07/03/18 04:00  97.4 F L  89    14  


 


 07/03/18 03:46       














  BP Pulse Ox Pulse Ox Pulse Ox


 


 07/03/18 13:48    97  98


 


 07/03/18 11:34  130/89  99  


 


 07/03/18 08:00   95  


 


 07/03/18 07:37  150/71 H  98  


 


 07/03/18 04:00  134/70  95  


 


 07/03/18 03:46   94 L  








 Weight











Weight                         185 lb 3.2 oz














I&O: 


 











 07/02/18 07/03/18 07/04/18





 06:59 06:59 06:59


 


Intake Total 1800 1225 


 


Output Total 2300 925 


 


Balance -500 300 











Result Diagrams: 


 07/03/18 04:15





 07/03/18 04:15


Additional Labs: 





Microbiology





06/29/18 13:48   Urine holm catheter   Urine Culture - Final


                              NO GROWTH AT 48 HOURS


06/29/18 13:58   Venous blood - Right Hand   Blood Culture - Preliminary


                              NO GROWTH AT 48 HOURS


06/29/18 13:44   Venous blood - Left Hand   Blood Culture - Preliminary


                              NO GROWTH AT 48 HOURS





labs reviewed


Radiology Reviewed by me: Yes (KUB-colonic distention,maily transverse megacolon

)





Phys Exam





- Physical Examination


Constitutional: NAD


HEENT: PERRLA, moist MMs, sclera anicteric, oral pharynx no lesions


Neck: no nodes, no JVD, supple, full ROM


Respiratory: no wheezing, no rales, no rhonchi, clear to auscultation bilateral


Cardiovascular: RRR, no significant murmur, no rub


Gastrointestinal: soft, non-tender


distension better than yesterday


Musculoskeletal: pulses present, edema present (improving)


Neurological: non-focal, normal sensation, moves all 4 limbs


Psychiatric: normal affect, A&O x 3





Dx/Plan


(1) Bilateral pulmonary embolism


Code(s): I26.99 - OTHER PULMONARY EMBOLISM WITHOUT ACUTE COR PULMONALE   Status

: Acute   Comment: on full anticoagulation   





(2) Left leg DVT


Code(s): I82.402 - ACUTE EMBOLISM AND THOMBOS UNSP DEEP VEINS OF L LOW EXTREM   

Status: Acute   


Qualifiers: 


   Chronicity: acute 





(3) Atrial fibrillation with RVR


Code(s): I48.91 - UNSPECIFIED ATRIAL FIBRILLATION   Status: Acute   Comment: 

rate controlled on Amiodarone.NSR now   





(4) Troponin level elevated


Code(s): R74.8 - ABNORMAL LEVELS OF OTHER SERUM ENZYMES   Status: Acute   

Comment: Likley demand ischemia from PE and a-fib w RVR. troponin trending down

   





(5) Acute diastolic CHF (congestive heart failure)


Code(s): I50.31 - ACUTE DIASTOLIC (CONGESTIVE) HEART FAILURE   Status: Resolved

   Comment: BNP>1000   





(6) Ulcerative colitis, acute


Code(s): K51.90 - ULCERATIVE COLITIS, UNSPECIFIED, WITHOUT COMPLICATIONS   

Status: Acute   Comment: On  Pentasa. Steroids stopped due to question of 

infection,now restarted as infection is ruled out   





(7) HLD (hyperlipidemia)


Code(s): E78.5 - HYPERLIPIDEMIA, UNSPECIFIED   Status: Chronic   





(8) Physical deconditioning


Code(s): R53.81 - OTHER MALAISE   Status: Chronic   





- Plan


plan discussed w/ family, continue antibiotics, PT/OT, respiratory therapy, 

incentive spirometry, out of bed/ambulate, DVT proph w/SCDs


cont Lovenox BID. Po from tomorrow.


-: H/h stable.cont to monitor


-: cont prednisone and pentasa.OP f/u w GI.Monitor for complication


-: Cont amiodarone.NSR.BP controlled


-: Rehab eval.cont IMCU monitoring given severe clot burden





* .








Review of Systems





- Review of Systems


Constitutional: weakness, malaise.  negative: fever, chills, sweats, other


Respiratory: negative: Cough, Dry, Shortness of Breath, Hemoptysis, SOB with 

Excertion, Pleuritic Pain, Sputum, Wheezing


Cardiovascular: negative: chest pain, palpitations, orthopnea, paroxysmal 

nocturnal dyspnea, edema, light headedness, other


Gastrointestinal: Diarrhea.  negative: Nausea, Vomiting, Abdominal Pain, 

Constipation, Melena, Hematochezia, Other


Genitourinary: negative: Dysuria, Frequency, Incontinence, Hematuria, Retention

, Other


Musculoskeletal: negative: Neck Pain, Shoulder Pain, Arm Pain, Back Pain, Hand 

Pain, Leg Pain, Foot Pain, Other


Skin: negative: Rash, Lesions, Sorin, Bruising, Other


Neurological: negative: Weakness, Numbness, Incoordination, Change in Speech, 

Confusion, Seizures, Other





- Medications/Allergies


Allergies/Adverse Reactions: 


 Allergies











Allergy/AdvReac Type Severity Reaction Status Date / Time


 


clindamycin [From Cleocin] Allergy   Verified 06/18/18 13:44


 


levofloxacin Allergy   Verified 06/18/18 13:44


 


Penicillins Allergy   Verified 06/18/18 13:44


 


polyethylene glycol Allergy   Verified 06/18/18 13:44





[From Colyte]     


 


polyethylene glycol 3350 Allergy   Verified 06/18/18 13:44





[From Colyte]     


 


potassium chloride Allergy   Verified 06/18/18 13:44





[From Colyte]     


 


sodium [From Colyte] Allergy   Verified 06/18/18 13:44


 


sodium bicarbonate Allergy   Verified 06/18/18 13:44





[From Colyte]     


 


sodium chloride [From Colyte] Allergy   Verified 06/18/18 13:44


 


sodium sulfate [From Colyte] Allergy   Verified 06/18/18 13:44


 


Sulfa (Sulfonamide Allergy   Verified 06/18/18 13:44





Antibiotics)     











Medications: 


 Current Medications





Acetaminophen (Tylenol)  650 mg PO Q4H PRN


   PRN Reason: Headache/Fever or Pain


   Last Admin: 06/29/18 17:42 Dose:  650 mg


Hydrocodone Bitart/Acetaminophen (Norco 5/325)  1 tab PO Q4H PRN


   PRN Reason: Moderate Pain (4-6)


   Last Admin: 07/02/18 23:54 Dose:  1 tab


Al Hydroxide/Mg Hydroxide (Maalox)  30 ml PO Q6H PRN


   PRN Reason: Heartburn  or Indigestion


Artificial Tears (Tears Naturale)  0 drop EA EYE PRN PRN


   PRN Reason: Dry Eyes


Atorvastatin Calcium (Lipitor)  40 mg PO HS ERIN


   Last Admin: 07/02/18 20:54 Dose:  40 mg


Cefdinir (Omnicef)  300 mg PO BID ERIN


   Stop: 07/07/18 09:01


   Last Admin: 07/03/18 12:05 Dose:  300 mg


Cyanocobalamin (Vitamin B-12)  1,000 mcg PO DAILY [facility]


   Last Admin: 07/03/18 08:09 Dose:  1,000 mcg


Dronedarone (Multaq)  400 mg PO BID-[facility]


   Last Admin: 07/03/18 08:08 Dose:  400 mg


Enoxaparin Sodium (Lovenox)  80 mg SC 0900,2100 [facility]


   Last Admin: 07/03/18 08:09 Dose:  80 mg


Folic Acid (Folvite)  1 mg PO DAILY [facility]


   Last Admin: 07/03/18 08:10 Dose:  1 mg


Guaifenesin (Robitussin Sf)  200 mg PO Q4H PRN


   PRN Reason: Cough


Heparin Sodium (Porcine) (Heparin 1,000 Units/Ml (10 Ml))  0 units SLOW IVP 

ASDIR [facility]


   PRN Reason: Protocol


Loperamide HCl (Imodium)  2 mg PO PRN PRN


   PRN Reason: Diarrhea/Loose Stools


Loratadine (Claritin)  10 mg PO DAILYPRN PRN


   PRN Reason: Sinus Symptoms


Magnesium Hydroxide (Milk Of Magnesium)  30 ml PO DAILYPRN PRN


   PRN Reason: Constipation


Melatonin (Melatonin)  9 mg PO [facility]


   Last Admin: 07/02/18 22:17 Dose:  9 mg


Mesalamine (Pentasa)  2,000 mg PO BID [facility]


   Last Admin: 07/03/18 08:10 Dose:  2,000 mg


Mineral Oil/White Petrolatum (Eucerin Cream)  0 gm TOP BIDPRN PRN


   PRN Reason: Dry Skin


Ondansetron HCl (Zofran Odt)  4 mg PO Q6H PRN


   PRN Reason: Nausea/Vomiting


Ondansetron HCl (Zofran)  4 mg IVP Q6H PRN


   PRN Reason: Nausea/Vomiting


   Last Admin: 06/29/18 20:28 Dose:  4 mg


Pantoprazole Sodium (Protonix)  40 mg IVP Q12HR [facility]


   Last Admin: 07/03/18 08:11 Dose:  40 mg


Phenol (Chloraseptic Spray 180 Ml Bot)  0 ml PO PRN PRN


   PRN Reason: Sore Throat


Potassium Chloride (K-Dur)  [facility]q [facility]


Prednisone (Prednisone)  40 [facility]


   Stop: 07/08/18 08:01


   Last Admin: 07/03/18 08:09 Dose:  40 mg


Prednisone (Prednisone)  30 mg PO QAM-WM [facility]


   Stop: 07/15/18 08:01


Prednisone (Prednisone)  20 mg PO QAM-WM [facility]


   Stop: 07/22/18 08:01


Prednisone (Prednisone)  10 mg PO QAM-WM [facility]


   Stop: 07/29/18 08:01


Prednisone (Prednisone)  5 mg PO QAM-WM [facility]


   Stop: 08/05/18 08:01


Senna (Senokot)  2 tab PO HSPRN PRN


   PRN Reason: Constipation


Sodium Chloride (Ocean Nasal Spray 0.65%)  0 ml EA NARE QIDPRN PRN


   PRN Reason: Nasal Congestion


Sodium Chloride (Flush - Normal Saline)  10 ml IVF Q12HR [facility]


   Last Admin: 07/03/18 08:11 Dose:  10 ml


Sodium Chloride (Flush - Normal Saline)  10 ml IVF PRN PRN


   PRN Reason: Saline Flush


Zolpidem Tartrate (Ambien)  5 mg PO HSPRN PRN


   PRN Reason: Insomnia

## 2018-07-04 LAB
ANION GAP SERPL CALC-SCNC: 8 MMOL/L (ref 10–20)
BUN SERPL-MCNC: 10 MG/DL (ref 9.8–20.1)
CALCIUM SERPL-MCNC: 7.7 MG/DL (ref 7.8–10.44)
CHLORIDE SERPL-SCNC: 105 MMOL/L (ref 98–107)
CO2 SERPL-SCNC: 27 MMOL/L (ref 23–31)
CREAT CL PREDICTED SERPL C-G-VRATE: 118 ML/MIN (ref 70–130)
DIGOXIN SERPL-MCNC: 0.46 NG/ML (ref 0.8–2)
GLUCOSE SERPL-MCNC: 121 MG/DL (ref 83–110)
HGB BLD-MCNC: 11.2 G/DL (ref 12–16)
HGB BLD-MCNC: 11.2 G/DL (ref 12–16)
HGB BLD-MCNC: 11.4 G/DL (ref 12–16)
MCH RBC QN AUTO: 34.6 PG (ref 27–31)
MCV RBC AUTO: 101 FL (ref 78–98)
MDIFF COMPLETE?: YES
PLATELET # BLD AUTO: 205 THOU/UL (ref 130–400)
PLATELET # BLD AUTO: 210 THOU/UL (ref 130–400)
PLATELET BLD QL SMEAR: (no result)
POTASSIUM SERPL-SCNC: 3.2 MMOL/L (ref 3.5–5.1)
RBC # BLD AUTO: 3.3 MILL/UL (ref 4.2–5.4)
SODIUM SERPL-SCNC: 137 MMOL/L (ref 136–145)
WBC # BLD AUTO: 17 THOU/UL (ref 4.8–10.8)

## 2018-07-04 PROCEDURE — 06H03DZ INSERTION OF INTRALUMINAL DEVICE INTO INFERIOR VENA CAVA, PERCUTANEOUS APPROACH: ICD-10-PCS | Performed by: THORACIC SURGERY (CARDIOTHORACIC VASCULAR SURGERY)

## 2018-07-04 RX ADMIN — Medication SCH ML: at 11:18

## 2018-07-04 RX ADMIN — Medication SCH ML: at 21:15

## 2018-07-04 RX ADMIN — HYDROCODONE BITARTRATE AND ACETAMINOPHEN PRN TAB: 5; 325 TABLET ORAL at 11:21

## 2018-07-04 RX ADMIN — HYDROCODONE BITARTRATE AND ACETAMINOPHEN PRN TAB: 5; 325 TABLET ORAL at 06:00

## 2018-07-04 RX ADMIN — HYDROCODONE BITARTRATE AND ACETAMINOPHEN PRN TAB: 5; 325 TABLET ORAL at 17:43

## 2018-07-04 RX ADMIN — CYANOCOBALAMIN TAB 1000 MCG SCH MCG: 1000 TAB at 11:18

## 2018-07-04 RX ADMIN — HYDROCODONE BITARTRATE AND ACETAMINOPHEN PRN TAB: 5; 325 TABLET ORAL at 22:01

## 2018-07-04 NOTE — OP
DATE OF PROCEDURE:  07/04/2018

 

PROCEDURE PERFORMED:  Cordis TRAPEASE permanent IVC filter placement.  The right femoral vein with fl
uoroscopic and ultrasonographic guidance.

 

PREOPERATIVE DIAGNOSES:  Left lower extremity deep venous thrombosis with pulmonary emboli and contra
indication to anticoagulation.

 

POSTOPERATIVE DIAGNOSES:  Left lower extremity deep venous thrombosis with pulmonary emboli and contr
aindication to anticoagulation.

 

SURGEON:  Mamadou Martinez M.D.

 

ANESTHESIA:  A 1% lidocaine local anesthesia.

 

INDICATIONS:  The patient is an 83-year-old woman with ulcerative colitis, who has had pulmonary embo
li from left lower extremity source.  She was started on conventional anticoagulation, but began havi
ng GI bleeding.  She is now taken to the cath lab for vena cava filter placement.

 

FINDINGS:  Renal veins identified at inferior border of L1.  The superior tip of the device was place
d just below the superior border of L2.  Total of 10 mL of Optiray contrast and 1.8 minutes of fluoro
scopy time were utilized.

 

NARRATIVE REPORT:  After informed consent was obtained, the patient was taken to the cath lab and jerry
alfredo on the cath table.  Her groins were prepped and draped in sterile fashion.  An ultrasound was use
d to identify the femoral vessels and confirmed compressibility of the femoral vein on the right side
.  A 1% lidocaine was then used to infiltrate the skin and subcutaneous tissues.  A large bore needle
 was used to cannulate the right renal vein through which a guidewire was placed and position confirm
ed by fluoroscopy.  Tract was dilated and a dilator and sheath were introduced over the wire.  The sh
eath and dilator were injected with contrast material identifying the orifices of the renal veins.  U
sing that as landmarks, the dilator was removed and the device was introduced through the sheath with
 the tip of the sheath at the superior margin of the L2 vertebral body.  The sheath was withdrawn dep
loying the device, the sheath was removed and pressure was held to achieve hemostasis.  The patient w
as then returned to her room in stable condition.

## 2018-07-04 NOTE — PRG
DATE OF SERVICE:  07/04/2018

 

SUBJECTIVE:  Ms. Garcia has had increased liquidy red bloody diarrhea.  She has no abdominal pain,
 but she has had a decrease in her appetite.  Her Lovenox was held this morning.  She did have a decr
ease in her hemoglobin from 12.7 yesterday to 11.2 today associated with the bleeding.  She had an IV
C filter placed due to the pulmonary embolism and DVT.

 

OBJECTIVE:

VITAL SIGNS:  Temperature 97.7, pulse 103, blood pressure 103/66.

GENERAL:  She is in no acute distress.  She is alert and oriented x3.

LUNGS:  Clear to auscultation bilaterally.

HEART:  Regular rate and rhythm.

ABDOMEN:  Soft, nontender, nondistended.

EXTREMITIES:  No lower extremity edema.

 

IMPRESSION:  New diagnosis of ulcerative colitis from the rectum to the transverse colon.  She had in
itial clinical improvement with steroids, but then these were held due to concern about infection and
 then restarted again a couple of days ago.  She started back with rectal bleeding last night with mu
ltiple red bloody stools.

 

RECOMMENDATIONS:

1.  Switch back to IV methylprednisolone 20 mg q.8h.

2.  I believe she should be able to restart full anticoagulation with Lovenox later this evening.  I 
will continue follow her hemoglobin.

## 2018-07-04 NOTE — PRG
DATE OF SERVICE:  07/04/2018

 

SERVICE:  Pulmonary Medicine.

 

INTERVAL HISTORY:  Patient is doing fine from a respiratory standpoint.  She denies any current chest
 pain, nausea, vomiting, fevers or chills.  Otherwise, there has been no overnight events.  She ended
 up going for an IVC filter this morning.  Her anticoagulation was held for that procedure, but we we
re going to getting it back on.  I have spoken with GI.  He says it is perfectly reasonable to contin
ue with anticoagulation course because the patient is really not having a discrete bleeding episode. 
 It is really more of diffuse inflammatory changes with oozing of blood.

 

PHYSICAL EXAMINATION:

VITAL SIGNS:  Afebrile, pulse 105, blood pressure 115/72, respirations 17, saturation 92% on room air
.

GENERAL:  The patient is awake, alert, no apparent distress.

LUNGS:  Decent air entry.  There is no prolonged expiratory phase, wheezing present.

HEART:  Normal rate and regular.

ABDOMEN:  Soft, nontender, and nondistended.  Bowel sounds are positive.

MUSCULOSKELETAL:  No cyanosis or clubbing.  No pitting in the bilateral lower extremities.

NEUROLOGIC:  Grossly nonfocal.

 

LABORATORY DATA:  Hemoglobin 11.2, WBC 17.0, platelets 210,000.  Basic metabolic profile is essential
ly unremarkable except for potassium of 3.2.  Blood cultures x2 and urine culture is unremarkable.

 

ASSESSMENT:

1.  Pulmonary embolism.

2.  Ulcerative colitis.

3.  Deconditioning, severe.

4.  Hypokalemia.

5.  Status post IVC filter placement, postop day 0.

 

DISCUSSION AND PLAN:  I will replace potassium.  We will restart her full dose anticoagulation right 
now.  We will give her 1.5 mg/k subcu x1 and restart that medication first thing in the morning.  If 
she tolerates this, I think it would be reasonable to switch her to a direct oral anticoagulant.  Pul
monary or Critical Care will continue to follow along while she remains in this location.  Multiple d
ose of potassium will be provided today.

## 2018-07-04 NOTE — PDOC.PN
- Subjective


Encounter Start Date: 07/04/18


Encounter Start Time: 13:16


Subjective: multiple bloody BMs since yesterday.no abd pain.no N/V


-: denies any SOB/CP


-: c/p poor apppetite





- Objective


Resuscitation Status: 


 











Resuscitation Status           FULL:Full Resuscitation














MAR Reviewed: Yes


Vital Signs & Weight: 


 Vital Signs (12 hours)











  Temp Pulse Resp BP Pulse Ox


 


 07/04/18 11:43  97.7 F  103 H  18  103/66  99


 


 07/04/18 11:17   108 H   


 


 07/04/18 08:00  98.6 F  98  17   96


 


 07/04/18 07:26  98.6 F  98  17  111/70  97


 


 07/04/18 06:00   102 H  18  111/84 


 


 07/04/18 04:00   86  18  131/70  97


 


 07/04/18 03:52  98.4 F  79  16  140/73  97








 Weight











Weight                         185 lb 14.4 oz














I&O: 


 











 07/03/18 07/04/18 07/05/18





 06:59 06:59 06:59


 


Intake Total 1225 1520 


 


Output Total 925 875 


 


Balance 300 645 











Result Diagrams: 


 07/04/18 10:32





 07/04/18 03:54


Additional Labs: 





Microbiology





06/29/18 13:48   Urine holm catheter   Urine Culture - Final


                              NO GROWTH AT 48 HOURS


06/29/18 13:58   Venous blood - Right Hand   Blood Culture - Preliminary


                              NO GROWTH AT 48 HOURS


06/29/18 13:44   Venous blood - Left Hand   Blood Culture - Preliminary


                              NO GROWTH AT 48 HOURS





labs reviewed





Phys Exam





- Physical Examination


Constitutional: NAD


HEENT: PERRLA, moist MMs, sclera anicteric, oral pharynx no lesions


Neck: no nodes, no JVD, supple, full ROM


Respiratory: no wheezing, no rales, no rhonchi, clear to auscultation bilateral


Cardiovascular: RRR, no significant murmur


Gastrointestinal: soft, non-tender, no distention, positive bowel sounds


Musculoskeletal: pulses present, edema present


Neurological: non-focal, normal sensation, moves all 4 limbs


Psychiatric: normal affect, A&O x 3


Skin: no rash





Dx/Plan


(1) Bilateral pulmonary embolism


Code(s): I26.99 - OTHER PULMONARY EMBOLISM WITHOUT ACUTE COR PULMONALE   Status

: Acute   Comment: Anticoagulation held due to blood in stools.   





(2) Left leg DVT


Code(s): I82.402 - ACUTE EMBOLISM AND THOMBOS UNSP DEEP VEINS OF L LOW EXTREM   

Status: Acute   


Qualifiers: 


   Chronicity: acute 





(3) Atrial fibrillation with RVR


Code(s): I48.91 - UNSPECIFIED ATRIAL FIBRILLATION   Status: Acute   Comment: 

rate controlled on Amiodarone.NSR now   





(4) Troponin level elevated


Code(s): R74.8 - ABNORMAL LEVELS OF OTHER SERUM ENZYMES   Status: Acute   

Comment: Likley demand ischemia from PE and a-fib w RVR. troponin trending down

   





(5) Acute diastolic CHF (congestive heart failure)


Code(s): I50.31 - ACUTE DIASTOLIC (CONGESTIVE) HEART FAILURE   Status: Resolved

   Comment: BNP>1000   





(6) Ulcerative colitis, acute


Code(s): K51.90 - ULCERATIVE COLITIS, UNSPECIFIED, WITHOUT COMPLICATIONS   

Status: Acute   Comment: On  Pentasa. Steroids stopped due to question of 

infection,now restarted as infection is ruled out.   





(7) HLD (hyperlipidemia)


Code(s): E78.5 - HYPERLIPIDEMIA, UNSPECIFIED   Status: Chronic   





(8) Physical deconditioning


Code(s): R53.81 - OTHER MALAISE   Status: Chronic   





- Plan


plan discussed w/ family, PT/OT, out of bed/ambulate, DVT proph w/SCDs


cont IV steroids,Pentasa. H/H q 4 h.type & cross & transfuse prn


-: discussed w PCCM & GI.cont to monitor.


-: ?restart Anticoagulation w precaution given massive saddle PE-defer to PCCM


-: hemodynamically stable.cont to monitor.encourage PO intake


-: cont Multaq for a-fib.





* .No evidence of infection. cont empiric ABx per PCCM


* increased IV steroids dose for colitis


* High risk of decompensation.cont CCU monitoring








Review of Systems





- Review of Systems


Constitutional: weakness, malaise.  negative: fever, chills, sweats, other


Respiratory: negative: Cough, Dry, Shortness of Breath, Hemoptysis, SOB with 

Excertion, Pleuritic Pain, Sputum, Wheezing


Cardiovascular: negative: chest pain, palpitations, orthopnea, paroxysmal 

nocturnal dyspnea, edema, light headedness, other


Gastrointestinal: Diarrhea, Hematochezia.  negative: Nausea, Vomiting, 

Abdominal Pain, Constipation, Melena, Other


Genitourinary: negative: Dysuria, Frequency, Incontinence, Hematuria, Retention

, Other


Musculoskeletal: negative: Neck Pain, Shoulder Pain, Arm Pain, Back Pain, Hand 

Pain, Leg Pain, Foot Pain, Other


Neurological: negative: Weakness, Numbness, Incoordination, Change in Speech, 

Confusion, Seizures, Other





- Medications/Allergies


Allergies/Adverse Reactions: 


 Allergies











Allergy/AdvReac Type Severity Reaction Status Date / Time


 


clindamycin [From Cleocin] Allergy   Verified 06/18/18 13:44


 


levofloxacin Allergy   Verified 06/18/18 13:44


 


Penicillins Allergy   Verified 06/18/18 13:44


 


polyethylene glycol Allergy   Verified 06/18/18 13:44





[From Colyte]     


 


polyethylene glycol 3350 Allergy   Verified 06/18/18 13:44





[From Colyte]     


 


potassium chloride Allergy   Verified 06/18/18 13:44





[From Colyte]     


 


sodium [From Colyte] Allergy   Verified 06/18/18 13:44


 


sodium bicarbonate Allergy   Verified 06/18/18 13:44





[From Colyte]     


 


sodium chloride [From Colyte] Allergy   Verified 06/18/18 13:44


 


sodium sulfate [From Colyte] Allergy   Verified 06/18/18 13:44


 


Sulfa (Sulfonamide Allergy   Verified 06/18/18 13:44





Antibiotics)     











Medications: 


 Current Medications





Acetaminophen (Tylenol)  650 mg PO Q4H PRN


   PRN Reason: Headache/Fever or Pain


   Last Admin: 06/29/18 17:42 Dose:  650 mg


Hydrocodone Bitart/Acetaminophen (Norco 5/325)  1 tab PO Q4H PRN


   PRN Reason: Moderate Pain (4-6)


   Last Admin: 07/04/18 11:21 Dose:  1 tab


Al Hydroxide/Mg Hydroxide (Maalox)  30 ml PO Q6H PRN


   PRN Reason: Heartburn  or Indigestion


Artificial Tears (Tears Naturale)  0 drop EA EYE PRN PRN


   PRN Reason: Dry Eyes


Atorvastatin Calcium (Lipitor)  40 mg PO HS [facility]


   Last Admin: 07/03/18 20:55 Dose:  40 mg


Cefdinir (Omnicef)  300 mg PO BID [facility]


   Stop: 07/07/18 09:01


   Last Admin: 07/04/18 11:18 Dose:  300 mg


Cyanocobalamin (Vitamin B-12)  1,000 mcg PO DAILY [facility]


   Last Admin: 07/04/18 11:18 Dose:  1,000 mcg


Digoxin (Lanoxin)  0.125 mg PO DAILY [facility]


   Last Admin: 07/04/18 11:17 Dose:  0.125 mg


Dronedarone (Multaq)  400 mg PO BID-[facility]


   Last Admin: 07/04/18 11:17 Dose:  400 mg


Folic Acid (Folvite)  1 mg PO DAILY [facility]


   Last Admin: 07/04/18 11:18 Dose:  1 mg


Guaifenesin (Robitussin Sf)  200 mg PO Q4H PRN


   PRN Reason: Cough


Loperamide HCl (Imodium)  2 mg PO PRN PRN


   PRN Reason: Diarrhea/Loose Stools


Loratadine (Claritin)  10 mg PO DAILYPRN PRN


   PRN Reason: Sinus Symptoms


Magnesium Hydroxide (Milk Of Magnesium)  30 ml PO DAILYPRN PRN


   PRN Reason: Constipation


Melatonin (Melatonin)  9 mg PO [facility]


   Last Admin: 07/03/18 20:55 Dose:  9 mg


Mesalamine (Pentasa)  2,000 mg PO BID [facility]


   Last Admin: 07/04/18 11:36 Dose:  2,000 mg


Methylprednisolone Sodium Succinate (Solu-Medrol)  20 mg IVP Q8HR [facility]


Mineral Oil/White Petrolatum (Eucerin Cream)  0 gm TOP BIDPRN PRN


   PRN Reason: Dry Skin


Ondansetron HCl (Zofran Odt)  4 mg PO Q6H PRN


   PRN Reason: Nausea/Vomiting


Ondansetron HCl (Zofran)  4 mg IVP Q6H PRN


   PRN Reason: Nausea/Vomiting


   Last Admin: 07/04/18 11:32 Dose:  4 mg


Pantoprazole Sodium (Protonix)  40 mg IVP Q12HR [facility]


   Last Admin: 07/04/18 11:13 Dose:  40 mg


Phenol (Chloraseptic Spray 180 Ml Bot)  0 ml PO PRN PRN


   PRN Reason: Sore Throat


Potassium Chloride (K-Dur)  20 meq PO BID-[facility]


   Last Admin: 07/04/18 11:18 Dose:  20 meq


Senna (Senokot)  2 tab PO HSPRN PRN


   PRN Reason: Constipation


Sodium Chloride (Ocean Nasal Spray 0.65%)  0 ml EA NARE QIDPRN PRN


   PRN Reason: Nasal Congestion


Sodium Chloride (Flush - Normal Saline)  10 ml IVF Q12HR [facility]


   Last Admin: 07/04/18 11:18 Dose:  10 ml


Sodium Chloride (Flush - Normal Saline)  10 ml IVF PRN PRN


   PRN Reason: Saline Flush


Zolpidem Tartrate (Ambien)  5 mg PO HSPRN PRN


   PRN Reason: Insomnia

## 2018-07-04 NOTE — CON
DATE OF CONSULTATION:  07/04/2018

 

REQUESTING PHYSICIAN:  Dr. Thompson.

 

REASON FOR CONSULTATION:  Pulmonary emboli with contraindication to anticoagulation.

 

HISTORY OF PRESENT ILLNESS:  The patient is an 83-year-old woman with frequent bloody stools, who was
 recently discharged after a 10-day hospitalization with one of these flareups of ulcerative colitis.
  She presented with chest pain in the general area of the right costal margin.  She was found to hav
e pulmonary emboli and DVT in the left lower extremity.  She was started on Lovenox, but had recurren
ce of her bloody bowel movements.

 

PAST MEDICAL HISTORY:  Primarily significant for her ulcerative colitis.

 

MEDICATIONS:  At the time of her recent discharge were Lipitor, Prilosec, Pentasa, Florastor, prednis
one, and vitamin C.

 

ALLERGIES:  She reports multiple medical allergies.

 

REVIEW OF SYSTEMS:  Most notable for her right costal margin pain and bloody bowel movements.

 

PHYSICAL EXAMINATION:

VITAL SIGNS:  On exam, her temperature is 98.6, heart rate is 98, blood pressure 111/70.  Room air O2
 sats are 96%.

LUNGS:  She has clear breath sounds.

CARDIOVASCULAR:  Regular rate and rhythm.

ABDOMEN:  Soft.

EXTREMITIES:  Nontender.

 

LABORATORY AND X-RAY FINDINGS:  Her CT scan shows a pulmonary emboli and the very inferior extent of 
the CT scan shows normal size vena cava with right and left renal veins coming at about the level of 
the inferior margin of L1 or superior margin of L2.  On laboratory exam, her hemoglobin had been in t
he 13-13.5 range.  At around the time of her discharge, it was 12.7 yesterday morning and it is now 1
1.4.  Her creatinine is 0.48.

 

IMPRESSION AND RECOMMENDATIONS:  Deep venous thrombosis with pulmonary embolism with contraindication
 to anticoagulation.  I think an inferior vena cava filter placement is entirely appropriate.

## 2018-07-05 LAB
ANION GAP SERPL CALC-SCNC: 10 MMOL/L (ref 10–20)
BUN SERPL-MCNC: 21 MG/DL (ref 9.8–20.1)
CALCIUM SERPL-MCNC: 7.9 MG/DL (ref 7.8–10.44)
CHLORIDE SERPL-SCNC: 107 MMOL/L (ref 98–107)
CO2 SERPL-SCNC: 25 MMOL/L (ref 23–31)
CREAT CL PREDICTED SERPL C-G-VRATE: 78 ML/MIN (ref 70–130)
GLUCOSE SERPL-MCNC: 125 MG/DL (ref 83–110)
HGB BLD-MCNC: 10 G/DL (ref 12–16)
MCH RBC QN AUTO: 34.4 PG (ref 27–31)
MCV RBC AUTO: 102 FL (ref 78–98)
MDIFF COMPLETE?: YES
PLATELET # BLD AUTO: 216 THOU/UL (ref 130–400)
POTASSIUM SERPL-SCNC: 5.2 MMOL/L (ref 3.5–5.1)
RBC # BLD AUTO: 2.92 MILL/UL (ref 4.2–5.4)
SODIUM SERPL-SCNC: 137 MMOL/L (ref 136–145)
WBC # BLD AUTO: 26.6 THOU/UL (ref 4.8–10.8)

## 2018-07-05 RX ADMIN — Medication SCH ML: at 20:30

## 2018-07-05 RX ADMIN — HYDROCODONE BITARTRATE AND ACETAMINOPHEN PRN TAB: 5; 325 TABLET ORAL at 20:35

## 2018-07-05 RX ADMIN — Medication SCH ML: at 09:58

## 2018-07-05 RX ADMIN — CYANOCOBALAMIN TAB 1000 MCG SCH MCG: 1000 TAB at 09:58

## 2018-07-05 RX ADMIN — HYDROCODONE BITARTRATE AND ACETAMINOPHEN PRN TAB: 5; 325 TABLET ORAL at 06:56

## 2018-07-05 RX ADMIN — HYDROCODONE BITARTRATE AND ACETAMINOPHEN PRN TAB: 5; 325 TABLET ORAL at 15:56

## 2018-07-05 RX ADMIN — HYDROCODONE BITARTRATE AND ACETAMINOPHEN PRN TAB: 5; 325 TABLET ORAL at 10:56

## 2018-07-05 NOTE — PDOC.PN
- Subjective


Encounter Start Date: 07/05/18


Encounter Start Time: 17:35


Subjective: feels better but still not eating much.


-: still c/o some abd pain


-: no more bleeding AR.NL BM this morning





- Objective


Resuscitation Status: 


 











Resuscitation Status           FULL:Full Resuscitation














MAR Reviewed: Yes


Vital Signs & Weight: 


 Vital Signs (12 hours)











  Temp Pulse Resp BP Pulse Ox


 


 07/05/18 15:24  98.6 F  101 H  18  118/70  93 L


 


 07/05/18 11:09  97.4 F L  94  16  129/69  94 L


 


 07/05/18 09:58   93   


 


 07/05/18 08:00  98 F  93  16   98


 


 07/05/18 07:27  98.0 F  93  17  153/72 H  96








 Weight











Weight                         186 lb 2 oz














I&O: 


 











 07/04/18 07/05/18 07/06/18





 06:59 06:59 06:59


 


Intake Total 1520 840 


 


Output Total 875 225 


 


Balance 645 615 











Result Diagrams: 


 07/05/18 04:22





 07/05/18 04:22


Additional Labs: 





Microbiology





06/29/18 13:58   Venous blood - Right Hand   Blood Culture - Final


                              NO GROWTH IN 5 DAYS


06/29/18 13:48   Urine holm catheter   Urine Culture - Final


                              NO GROWTH AT 48 HOURS


06/29/18 13:44   Venous blood - Left Hand   Blood Culture - Final


                              NO GROWTH IN 5 DAYS





 Laboratory Tests











  07/03/18 07/04/18 07/04/18





  04:15 03:54 10:32


 


Hgb  12.7  11.4 L  11.2 L














  07/04/18 07/05/18





  14:21 04:22


 


Hgb  11.2 L  10.0 L








labs reviewed





Phys Exam





- Physical Examination


Constitutional: NAD


HEENT: PERRLA, moist MMs, sclera anicteric, oral pharynx no lesions


Neck: no nodes, no JVD, supple, full ROM


Respiratory: no wheezing, no rales, no rhonchi, clear to auscultation bilateral


Cardiovascular: RRR, no significant murmur


Gastrointestinal: soft, non-tender, no distention, positive bowel sounds


Musculoskeletal: pulses present, edema present


Neurological: non-focal, normal sensation, moves all 4 limbs


Psychiatric: normal affect, A&O x 3


Skin: no rash





Dx/Plan


(1) Bilateral pulmonary embolism


Code(s): I26.99 - OTHER PULMONARY EMBOLISM WITHOUT ACUTE COR PULMONALE   Status

: Acute   Comment: Anticoagulation restarted 6/4/18   





(2) Left leg DVT


Code(s): I82.402 - ACUTE EMBOLISM AND THOMBOS UNSP DEEP VEINS OF L LOW EXTREM   

Status: Acute   


Qualifiers: 


   Chronicity: acute 





(3) Atrial fibrillation with RVR


Code(s): I48.91 - UNSPECIFIED ATRIAL FIBRILLATION   Status: Acute   Comment: 

rate controlled on Amiodarone.NSR now   





(4) Troponin level elevated


Code(s): R74.8 - ABNORMAL LEVELS OF OTHER SERUM ENZYMES   Status: Acute   

Comment: Likley demand ischemia from PE and a-fib w RVR. troponin trending down

   





(5) Acute diastolic CHF (congestive heart failure)


Code(s): I50.31 - ACUTE DIASTOLIC (CONGESTIVE) HEART FAILURE   Status: Resolved

   Comment: BNP>1000   





(6) Ulcerative colitis, acute


Code(s): K51.90 - ULCERATIVE COLITIS, UNSPECIFIED, WITHOUT COMPLICATIONS   

Status: Acute   Comment: On  Pentasa. Steroids stopped due to question of 

infection,now restarted as infection is ruled out.   





(7) HLD (hyperlipidemia)


Code(s): E78.5 - HYPERLIPIDEMIA, UNSPECIFIED   Status: Chronic   





(8) Physical deconditioning


Code(s): R53.81 - OTHER MALAISE   Status: Chronic   





(9) GI bleed


Code(s): K92.2 - GASTROINTESTINAL HEMORRHAGE, UNSPECIFIED   Status: Resolved   

Comment: secondary to #1, recheck H/H to make sure blood counts remaining 

stable   





- Plan


plan discussed w/ family, continue antibiotics, respiratory therapy, incentive 

spirometry, out of bed/ambulate, DVT proph w/SCDs


no GIB on Lovenox.cont.PO from tomorrow.monitor H/H


-: monitor Urine output.low this morning.encouraged to drink more


-: Cont high dose IV steroids for colonic imfalmmation.Appreciate GI input


-: encourage ambulation.Pt with minimal effort.SNU on DC


-: Empiric ABx. all Cx negative. will stop omnicef.stool studies pending.





* .








Review of Systems





- Review of Systems


Constitutional: weakness, malaise.  negative: fever, chills, sweats, other


Respiratory: negative: Cough, Dry, Shortness of Breath, Hemoptysis, SOB with 

Excertion, Pleuritic Pain, Sputum, Wheezing


Cardiovascular: negative: chest pain, palpitations, orthopnea, paroxysmal 

nocturnal dyspnea, edema, light headedness, other


Gastrointestinal: Abdominal Pain.  negative: Nausea, Vomiting, Diarrhea, 

Constipation, Melena, Hematochezia, Other


Genitourinary: negative: Dysuria, Frequency, Incontinence, Hematuria, Retention

, Other


Musculoskeletal: negative: Neck Pain, Shoulder Pain, Arm Pain, Back Pain, Hand 

Pain, Leg Pain, Foot Pain, Other


Skin: negative: Rash, Lesions, Sorin, Bruising, Other


Neurological: negative: Weakness, Numbness, Incoordination, Change in Speech, 

Confusion, Seizures, Other





- Medications/Allergies


Allergies/Adverse Reactions: 


 Allergies











Allergy/AdvReac Type Severity Reaction Status Date / Time


 


clindamycin [From Cleocin] Allergy   Verified 06/18/18 13:44


 


levofloxacin Allergy   Verified 06/18/18 13:44


 


Penicillins Allergy   Verified 06/18/18 13:44


 


polyethylene glycol Allergy   Verified 06/18/18 13:44





[From Colyte]     


 


polyethylene glycol 3350 Allergy   Verified 06/18/18 13:44





[From Colyte]     


 


potassium chloride Allergy   Verified 06/18/18 13:44





[From Colyte]     


 


sodium [From Colyte] Allergy   Verified 06/18/18 13:44


 


sodium bicarbonate Allergy   Verified 06/18/18 13:44





[From Colyte]     


 


sodium chloride [From Colyte] Allergy   Verified 06/18/18 13:44


 


sodium sulfate [From Colyte] Allergy   Verified 06/18/18 13:44


 


Sulfa (Sulfonamide Allergy   Verified 06/18/18 13:44





Antibiotics)     











Medications: 


 Current Medications





Acetaminophen (Tylenol)  650 mg PO Q4H PRN


   PRN Reason: Headache/Fever or Pain


   Last Admin: 07/05/18 09:57 Dose:  650 mg


Hydrocodone Bitart/Acetaminophen (Norco 5/325)  1 tab PO Q4H PRN


   PRN Reason: Moderate Pain (4-6)


   Last Admin: 07/05/18 15:56 Dose:  1 tab


Al Hydroxide/Mg Hydroxide (Maalox)  30 ml PO Q6H PRN


   PRN Reason: Heartburn  or Indigestion


Apixaban (Eliquis)  5 mg PO BID [facility]


Artificial Tears (Tears Naturale)  0 drop EA EYE PRN PRN


   PRN Reason: Dry Eyes


Atorvastatin Calcium (Lipitor)  40 mg PO HS [facility]


   Last Admin: 07/04/18 21:02 Dose:  40 mg


Cefdinir (Omnicef)  300 mg PO BID [facility]


   Stop: 07/07/18 09:01


   Last Admin: 07/05/18 09:53 Dose:  Not Given


Cyanocobalamin (Vitamin B-12)  1,000 mcg PO DAILY [facility]


   Last Admin: 07/05/18 09:58 Dose:  1,000 mcg


Digoxin (Lanoxin)  0.125 mg PO DAILY [facility]


   Last Admin: 07/05/18 09:58 Dose:  0.125 mg


[facility]


   Last Admin: 07/05/18 15:48 Dose:  400 mg


Folic Acid (Folvite)  1 mg PO DAILY [facility]


   Last Admin: 07/05/18 09:58 Dose:  1 mg


Guaifenesin (Robitussin Sf)  200 mg PO Q4H PRN


   PRN Reason: Cough


Loperamide HCl (Imodium)  2 mg PO PRN PRN


   PRN Reason: Diarrhea/Loose Stools


Loratadine (Claritin)  10 mg PO DAILYPRN PRN


   PRN Reason: Sinus Symptoms


Magnesium Hydroxide (Milk Of Magnesium)  30 ml PO DAILYPRN PRN


   PRN Reason: Constipation


Melatonin (Melatonin)  9 mg PO [facility]


   Last Admin: 07/04/18 21:03 Dose:  9 mg


Mesalamine (Pentasa)  2,000 mg PO BID [facility]


   Last Admin: 07/05/18 09:59 Dose:  2,000 mg


Methylprednisolone Sodium Succinate (Solu-Medrol)  20 mg IVP Q8HR [facility]


   Last Admin: 07/05/18 15:48 Dose:  20 mg


Metronidazole (Flagyl)  500 mg PO TID [facility]


   Last Admin: 07/05/18 15:48 Dose:  500 mg


Mineral Oil/White Petrolatum (Eucerin Cream)  0 gm TOP BIDPRN PRN


   PRN Reason: Dry Skin


Ondansetron HCl (Zofran Odt)  4 mg PO Q6H PRN


   PRN Reason: Nausea/Vomiting


Ondansetron HCl (Zofran)  4 mg IVP Q6H PRN


   PRN Reason: Nausea/Vomiting


   Last Admin: 07/04/18 11:32 Dose:  4 mg


Pantoprazole Sodium (Protonix)  40 mg IVP Q12HR [facility]


   Last Admin: 07/05/18 09:58 Dose:  40 mg


Phenol (Chloraseptic Spray 180 Ml Bot)  0 ml PO PRN PRN


   PRN Reason: Sore Throat


Senna (Senokot)  2 tab PO HSPRN PRN


   PRN Reason: Constipation


Sodium Chloride (Ocean Nasal Spray 0.65%)  0 ml EA NARE QIDPRN PRN


   PRN Reason: Nasal Congestion


Sodium Chloride (Flush - Normal Saline)  10 ml IVF Q12HR [facility]


   Last Admin: 07/05/18 09:58 Dose:  10 ml


Sodium Chloride (Flush - Normal Saline)  10 ml IVF PRN PRN


   PRN Reason: Saline Flush


Zolpidem Tartrate (Ambien)  5 mg PO HSPRN PRN


   PRN Reason: Insomnia

## 2018-07-05 NOTE — PRG
DATE OF SERVICE:  07/05/2018

 

SUBJECTIVE:  Ms. Garcia has had no further rectal bleeding today.  She had a small loose bowel mov
ement this morning.  She still has lower abdominal pain which is stable.

 

PHYSICAL EXAMINATION:

VITAL SIGNS:  Temperature 97.4, pulse 94, blood pressure 129/69.

 

LABORATORY DATA:  Her white blood cell count is 26,000.  Hemoglobin 10.0, platelets 216.  Creatinine 
0.73.

 

IMPRESSION:

1.  Chronic ulcerative colitis of the transverse and left colon.  She has been started on IV steroids
 and the bleeding has resolved.  Her diarrhea is improving.  She still has some stable lower abdomina
l pain.  Her white blood cell count did increase today; however, this could be a reaction to the ster
oids.  Given that she developed this flare despite having been on 3 months of oral budesonide, she is
 unlikely to maintain remission after just steroid treatment.  She will require longer term immune valdes
ppression.

2.  Deep venous thrombosis with pulmonary embolism.

 

RECOMMENDATIONS:

1.  We will transition from IV methylprednisolone to oral prednisone tomorrow or the next day.

2.  Continue to encourage ambulation with physical therapy.

3.  She can change to oral anticoagulation from a GI standpoint.

4.  We will continue to work to get approval for Entyvio for long-term treatment of her inflammatory 
bowel disease.

5.  Continue mesalamine.

## 2018-07-05 NOTE — PRG
DATE OF SERVICE:  07/05/2018

 

This morning she is awake, alert, and responsive, complaining of abdominal pain.

 

PHYSICAL EXAMINATION: 

VITAL SIGNS:  Sats are 96 on room air, respirations 16, temperature 98, pulse 93, blood pressure 150/
72.

CHEST:  Chest reveals decreased breath sounds, no wheezing.

CARDIAC:  Normal S1, S2, no gallops.

ABDOMEN:  Soft.

 

LABORATORY DATA:  White count is 26,000.  H&H 10 and 29, platelet count 216.  Electrolytes are normal
.

 

IMPRESSION:

1.  Status post colitis.

2.  Pulmonary emboli.

3.  Deep venous thrombosis.

4.  Status post inferior cava filter placed in for gastrointestinal bleeding.

 

PLAN:  Continue Lovenox for now.  Will discuss with GI to switch over to Eliquis.

 

Otherwise, empiric antibiotics, supportive care.

 

I will follow.

## 2018-07-06 LAB
ANION GAP SERPL CALC-SCNC: 9 MMOL/L (ref 10–20)
BUN SERPL-MCNC: 20 MG/DL (ref 9.8–20.1)
CALCIUM SERPL-MCNC: 7.6 MG/DL (ref 7.8–10.44)
CHLORIDE SERPL-SCNC: 106 MMOL/L (ref 98–107)
CO2 SERPL-SCNC: 24 MMOL/L (ref 23–31)
CREAT CL PREDICTED SERPL C-G-VRATE: 101 ML/MIN (ref 70–130)
GLUCOSE SERPL-MCNC: 130 MG/DL (ref 83–110)
HGB BLD-MCNC: 8.2 G/DL (ref 12–16)
HGB BLD-MCNC: 8.8 G/DL (ref 12–16)
MCH RBC QN AUTO: 34.1 PG (ref 27–31)
MCV RBC AUTO: 103 FL (ref 78–98)
MDIFF COMPLETE?: YES
PLATELET # BLD AUTO: 211 THOU/UL (ref 130–400)
POTASSIUM SERPL-SCNC: 4 MMOL/L (ref 3.5–5.1)
RBC # BLD AUTO: 2.57 MILL/UL (ref 4.2–5.4)
SODIUM SERPL-SCNC: 135 MMOL/L (ref 136–145)
WBC # BLD AUTO: 28.4 THOU/UL (ref 4.8–10.8)

## 2018-07-06 RX ADMIN — HYDROCODONE BITARTRATE AND ACETAMINOPHEN PRN TAB: 5; 325 TABLET ORAL at 03:13

## 2018-07-06 RX ADMIN — Medication SCH ML: at 09:27

## 2018-07-06 RX ADMIN — CYANOCOBALAMIN TAB 1000 MCG SCH MCG: 1000 TAB at 09:24

## 2018-07-06 RX ADMIN — HYDROCODONE BITARTRATE AND ACETAMINOPHEN PRN TAB: 5; 325 TABLET ORAL at 09:33

## 2018-07-06 RX ADMIN — HYDROCODONE BITARTRATE AND ACETAMINOPHEN PRN TAB: 5; 325 TABLET ORAL at 14:22

## 2018-07-06 NOTE — PRG
DATE OF SERVICE:  07/06/2018

 

SUBJECTIVE:  Ms. Garcia had a bad night last night with multiple bloody stools throughout the [facility]
t.

 

OBJECTIVE:

VITAL SIGNS:  Temperature 98.0, pulse 96, blood pressure 144/78.

GENERAL:  She is in no acute distress.  She is awake and alert.

LUNGS:  Clear to auscultation bilaterally.

HEART:  Regular rate and rhythm.

ABDOMEN:  Soft, mild tenderness in the lower abdomen without guarding.  Bowel sounds are present.

EXTREMITIES:  1+ pitting lower extremity edema.  She also has some puffiness in her hands today.

 

LABORATORY DATA:  White blood cell count 28.4, hemoglobin 8.8 down from 10.0 yesterday, platelets 211
 and creatinine 0.56.

 

IMPRESSION:

1.  Ulcerative colitis of the transverse and left colon.  She had symptomatically improved with IV st
eroids; however, had another episode of rectal bleeding and diarrhea last night.

2.  Anemia of acute blood loss.  Hemoglobin did decrease to 8.8 today.  I would recheck hemoglobin th
is afternoon if it is stable, then she should be able to continue with anticoagulation, may be switch
ed back to Lovenox at this point.  We will continue to follow the hemoglobin.

 

RECOMMENDATIONS:

1.  Send stool for C. diff.

2.  As long as C. diff is negative, then it is okay to use Imodium as needed.

3.  Continue IV steroids.

4.  We will check hemoglobin now.  If this is stable, then she can likely restart full anticoagulatio
n with Lovenox and continue to follow the blood count.

5.  Elevated white blood cell count is most likely secondary to the steroids.  Antibiotics are being 
tapered.  She is on metronidazole and cephalosporin has been discontinued.

## 2018-07-06 NOTE — PRG
DATE OF SERVICE:  07/06/2018

 

SUBJECTIVE:  Ms. Garcia had a rough night, profuse, multiple bowel movements 20, they are maroon c
olor.

 

OBJECTIVE:

VITAL SIGNS:  Sats are 98%, respirations 16, temperature 98, blood pressure 140/78.

EXTREMITIES:  She has got generalized edema in hands and lower extremity.

CHEST:  Chest reveals decreased breath sounds, no wheezing.

CARDIAC:  Normal S1, S2.  No gallops.

ABDOMEN:  Soft, no masses.

 

LABORATORY DATA:  Electrolytes are normal.  White count 28,000, H&H is 8 and 26, platelet count 211.

 

IMPRESSION:

1.  Colitis, profuse diarrhea, lower gastrointestinal bleed.

2.  Pulmonary embolism status post filter.

 

PLAN:  Discontinue Eliquis.  Lomotil for profuse diarrhea.  Multaq and digoxin for cardiac arrhythmia
s.  Continue supportive care.  Prognosis guarded.  We will follow.

## 2018-07-06 NOTE — PDOC.PN
- Subjective


Encounter Start Date: 07/06/18


Encounter Start Time: 15:05


Subjective: had multiple bloody looking BM last night


-: c/o swelling in hands and legs.c/o lower abd pain





- Objective


Resuscitation Status: 


 











Resuscitation Status           FULL:Full Resuscitation














MAR Reviewed: Yes


Vital Signs & Weight: 


 Vital Signs (12 hours)











  Temp Pulse Resp BP Pulse Ox


 


 07/06/18 11:21  99.0 F  104 H  17  122/79  96


 


 07/06/18 10:15      95


 


 07/06/18 09:41      95


 


 07/06/18 09:24   96   


 


 07/06/18 08:00  98.0 F  96  16  


 


 07/06/18 07:25   96  16  144/78 H  95


 


 07/06/18 04:00  98.3 F  93  18  154/74 H  96








 Weight











Weight                         186 lb 2 oz














I&O: 


 











 07/05/18 07/06/18 07/07/18





 06:59 06:59 06:59


 


Intake Total 840 840 


 


Output Total 225 450 


 


Balance 615 390 











Result Diagrams: 


 07/06/18 11:50





 07/06/18 05:45


Additional Labs: 





Microbiology





06/29/18 13:58   Venous blood - Right Hand   Blood Culture - Final


                              NO GROWTH IN 5 DAYS


06/29/18 13:48   Urine holm catheter   Urine Culture - Final


                              NO GROWTH AT 48 HOURS


06/29/18 13:44   Venous blood - Left Hand   Blood Culture - Final


                              NO GROWTH IN 5 DAYS





labs reviewed





Phys Exam





- Physical Examination


Constitutional: NAD


HEENT: PERRLA, moist MMs, sclera anicteric, oral pharynx no lesions


Neck: no nodes, no JVD, supple, full ROM


Respiratory: no wheezing, no rales, no rhonchi, clear to auscultation bilateral


Cardiovascular: RRR, no significant murmur


Gastrointestinal: soft, no distention, positive bowel sounds


TTP lower left abdomen


Musculoskeletal: pulses present, edema present


Neurological: non-focal, normal sensation, moves all 4 limbs


Psychiatric: normal affect, A&O x 3


Skin: no rash





Dx/Plan


(1) GI bleed


Code(s): K92.2 - GASTROINTESTINAL HEMORRHAGE, UNSPECIFIED   Status: Acute   

Comment: secondary to #1, recheck H/H to make sure blood counts remaining 

stable   





(2) Bilateral pulmonary embolism


Code(s): I26.99 - OTHER PULMONARY EMBOLISM WITHOUT ACUTE COR PULMONALE   Status

: Acute   Comment: Anticoagulation restarted 6/4/18.Now w increased 

hematochezia and drop in Hb. Eliquis stopped   





(3) Left leg DVT


Code(s): I82.402 - ACUTE EMBOLISM AND THOMBOS UNSP DEEP VEINS OF L LOW EXTREM   

Status: Acute   


Qualifiers: 


   Chronicity: acute 





(4) Atrial fibrillation with RVR


Code(s): I48.91 - UNSPECIFIED ATRIAL FIBRILLATION   Status: Acute   Comment: 

rate controlled on Amiodarone an ddigoxin.NSR now   





(5) Troponin level elevated


Code(s): R74.8 - ABNORMAL LEVELS OF OTHER SERUM ENZYMES   Status: Acute   

Comment: Likley demand ischemia from PE and a-fib w RVR. troponin trending down

   





(6) Acute diastolic CHF (congestive heart failure)


Code(s): I50.31 - ACUTE DIASTOLIC (CONGESTIVE) HEART FAILURE   Status: Resolved

   Comment: BNP>1000   





(7) Ulcerative colitis, acute


Code(s): K51.90 - ULCERATIVE COLITIS, UNSPECIFIED, WITHOUT COMPLICATIONS   

Status: Acute   Comment: On  Pentasa. Steroids stopped due to question of 

infection,now restarted as infection is ruled out.   





(8) HLD (hyperlipidemia)


Code(s): E78.5 - HYPERLIPIDEMIA, UNSPECIFIED   Status: Chronic   





(9) Physical deconditioning


Code(s): R53.81 - OTHER MALAISE   Status: Chronic   





- Plan


continue antibiotics, PT/OT, out of bed/ambulate, DVT proph w/SCDs


H/h lower.cont to hold eliquis.


-: stool for Cdiff. on Flagyl.


-: cont digoxin and Amiodarone


-: high dose steroids.


-: discussed w GI.am labs.stable for now





* .








Review of Systems





- Review of Systems


Constitutional: weakness, malaise.  negative: fever, chills, sweats, other


Respiratory: negative: Cough, Dry, Shortness of Breath, Hemoptysis, SOB with 

Excertion, Pleuritic Pain, Sputum, Wheezing


Cardiovascular: edema.  negative: chest pain, palpitations, orthopnea, 

paroxysmal nocturnal dyspnea, light headedness, other


Gastrointestinal: Nausea, Abdominal Pain, Diarrhea, Hematochezia


Genitourinary: negative: Dysuria, Frequency, Incontinence, Hematuria, Retention

, Other


Musculoskeletal: negative: Neck Pain, Shoulder Pain, Arm Pain, Back Pain, Hand 

Pain, Leg Pain, Foot Pain, Other


Skin: negative: Rash, Lesions, Sorin, Bruising, Other


Neurological: negative: Weakness, Numbness, Incoordination, Change in Speech, 

Confusion, Seizures, Other





- Medications/Allergies


Allergies/Adverse Reactions: 


 Allergies











Allergy/AdvReac Type Severity Reaction Status Date / Time


 


clindamycin [From Cleocin] Allergy   Verified 06/18/18 13:44


 


levofloxacin Allergy   Verified 06/18/18 13:44


 


Penicillins Allergy   Verified 06/18/18 13:44


 


polyethylene glycol Allergy   Verified 06/18/18 13:44





[From Colyte]     


 


polyethylene glycol 3350 Allergy   Verified 06/18/18 13:44





[From Colyte]     


 


potassium chloride Allergy   Verified 06/18/18 13:44





[From Colyte]     


 


sodium [From Colyte] Allergy   Verified 06/18/18 13:44


 


sodium bicarbonate Allergy   Verified 06/18/18 13:44





[From Colyte]     


 


sodium chloride [From Colyte] Allergy   Verified 06/18/18 13:44


 


sodium sulfate [From Colyte] Allergy   Verified 06/18/18 13:44


 


Sulfa (Sulfonamide Allergy   Verified 06/18/18 13:44





Antibiotics)     











Medications: 


 Current Medications





Acetaminophen (Tylenol)  650 mg PO Q4H PRN


   PRN Reason: Headache/Fever or Pain


   Last Admin: 07/05/18 09:57 Dose:  650 mg


Hydrocodone Bitart/Acetaminophen (Norco 5/325)  1 tab PO Q4H PRN


   PRN Reason: Moderate Pain (4-6)


   Last Admin: 07/06/18 14:22 Dose:  1 tab


Al Hydroxide/Mg Hydroxide (Maalox)  30 ml PO Q6H PRN


   PRN Reason: Heartburn  or Indigestion


Artificial Tears (Tears Naturale)  0 drop EA EYE PRN PRN


   PRN Reason: Dry Eyes


Atorvastatin Calcium (Lipitor)  40 mg PO [facility]


   Last Admin: 07/05/18 20:31 Dose:  40 mg


Cyanocobalamin (Vitamin B-12)  1,000 mcg PO DAILY [facility]


   Last Admin: 07/06/18 09:24 Dose:  1,000 mcg


Digoxin (Lanoxin)  0.125 mg PO DAILY [facility]


   Last Admin: 07/06/18 09:24 Dose:  0.125 mg


Dronedarone (Multaq)  400 mg PO BID-[facility]


   Last Admin: 07/06/18 09:24 Dose:  400 mg


Folic Acid (Folvite)  1 mg PO DAILY [facility]


   Last Admin: 07/06/18 09:25 Dose:  1 mg


Guaifenesin (Robitussin Sf)  200 mg PO Q4H PRN


   PRN Reason: Cough


Loperamide HCl (Imodium)  2 mg PO PRN PRN


   PRN Reason: Diarrhea/Loose Stools


   Last Admin: 07/06/18 09:24 Dose:  2 mg


Loratadine (Claritin)  10 mg PO DAILYPRN PRN


   PRN Reason: Sinus Symptoms


Magnesium Hydroxide (Milk Of Magnesium)  30 ml PO DAILYPRN PRN


   PRN Reason: Constipation


Melatonin (Melatonin)  9 mg PO HS [facility]


   Last Admin: 07/05/18 21:59 Dose:  9 mg


Mesalamine (Pentasa)  2,000 mg PO BID [facility]


   Last Admin: 07/06/18 09:25 Dose:  2,000 mg


Methylprednisolone Sodium Succinate (Solu-Medrol)  20 mg IVP Q8HR [facility]


   Last Admin: 07/06/18 14:23 Dose:  20 mg


Metronidazole (Flagyl)  500 mg PO TID [facility]


   Last Admin: 07/06/18 14:22 Dose:  500 mg


Mineral Oil/White Petrolatum (Eucerin Cream)  0 gm TOP BIDPRN PRN


   PRN Reason: Dry Skin


Ondansetron HCl (Zofran Odt)  4 mg PO Q6H PRN


   PRN Reason: Nausea/Vomiting


Ondansetron HCl (Zofran)  4 mg IVP Q6H PRN


   PRN Reason: Nausea/Vomiting


   Last Admin: 07/04/18 11:32 Dose:  4 mg


Pantoprazole Sodium (Protonix)  40 mg IVP Q12HR [facility]


   Last Admin: 07/06/18 09:26 Dose:  40 mg


Phenol (Chloraseptic Spray 180 Ml Bot)  0 ml PO PRN PRN


   PRN Reason: Sore Throat


Senna (Senokot)  2 tab PO HSPRN PRN


   PRN Reason: Constipation


Sodium Chloride (Ocean Nasal Spray 0.65%)  0 ml EA NARE QIDPRN PRN


   PRN Reason: Nasal Congestion


Sodium Chloride (Flush - Normal Saline)  10 ml IVF Q12HR [facility]


   Last Admin: 07/06/18 09:27 Dose:  10 ml


Sodium Chloride (Flush - Normal Saline)  10 ml IVF PRN PRN


   PRN Reason: Saline Flush


Zolpidem Tartrate (Ambien)  5 mg PO HSPRN PRN


   PRN Reason: Insomnia

## 2018-07-06 NOTE — PDOC.EVN
Event Note





- Event Note


Event Note: 





I have had meeting with pt's , and 2 daughters, they have stated that 

the pt is willing to be DNR/DNI  and comfort care at this point with no 

aggressive treatment to sustain her life, they have stated pt has stated ad 

they are in agreement to change our management to comfort care an to improve 

pain medications therapy that as of now is not totally controlling pain. we 

will follow expressed pt's wishes.

## 2018-07-06 NOTE — RAD
KUB:

7/6/18

 

COMPARISON:  

7/2/18

 

HISTORY: 

Abdominal pain and history of small bowel ileus. 

 

FINDINGS:  

Anterior views of the abdomen shows a nonspecific, nonobstructive bowel gas pattern. Air is seen thro
ughout the colon to the level of the rectum. Cholecystectomy clips are seen. An inferior vena cava fi
lter is seen. 

 

IMPRESSION:  

Nonobstructive bowel gas pattern. 

 

POS: [facility]

## 2018-07-07 RX ADMIN — CYANOCOBALAMIN TAB 1000 MCG SCH: 1000 TAB at 09:48

## 2018-07-07 RX ADMIN — Medication SCH: at 00:18

## 2018-07-07 RX ADMIN — Medication SCH: at 09:49

## 2018-07-07 RX ADMIN — Medication SCH: at 21:23

## 2018-07-07 NOTE — EKG
Test Reason : 

Blood Pressure : ***/*** mmHG

Vent. Rate : 134 BPM     Atrial Rate : 134 BPM

   P-R Int : 000 ms          QRS Dur : 080 ms

    QT Int : 372 ms       P-R-T Axes : 000 006 080 degrees

   QTc Int : 555 ms

 

Atrial fibrillation with rapid ventricular response

Septal infarct , age undetermined

Abnormal ECG

 

Confirmed by RUBINA ROWLEY MD ([id number]),  MARINE GREEN ([id number]) on 7/7/2018 12:24:24 PM

 

Referred By:             Confirmed By:RUBINA ROWLEY MD

## 2018-07-07 NOTE — PRG
DATE OF SERVICE:  07/07/2018

 

SUBJECTIVE:  Ms. Garcia has no pain currently.  She had a hard time with pain last night and was s
tarted on morphine, and her pain is now better controlled.  Her family has decided to make her comfor
t care status and has requested that all medications be held including steroids and anticoagulation.

 

OBJECTIVE:

VITAL SIGNS:  Temperature 97.9, pulse 115, blood pressure 170/88.

GENERAL:  She is in no acute distress.  She is oriented to her name and place and initially stated th
at the year was 1918.

LUNGS:  She appears to be breathing comfortably.  Her lungs are clear to auscultation bilaterally.

HEART:  Tachycardic, S1 and S2.

ABDOMEN:  Tender diffusely without guarding.  Bowel sounds are present.  She is a little more bloated
 or distended in her abdomen today.

EXTREMITIES:  No lower extremity edema.

 

IMPRESSION:

1.  New onset ulcerative colitis.  She did have initial significant improvement with IV steroids.  Ho
wever, she has had increased abdominal pain over the last few days.  She did have increase in rectal 
bleeding while on anticoagulation, the night before last.  However, her anticoagulation was held yest
erday and she has had no further bleeding through the night.  Recommendation is to continue IV steroi
ds and ideally would transition to Entyvio.  It turns out that the transfer to skilled nursing would 
be a problem with the Entyvio due to the cost of Entyvio.  Alternatively, we could start azathioprine
 instead.  However, this may be of slower onset and require a longer period of prednisone.

2.  Anemia of acute blood loss.  She and her family declined blood work today.  She is not having fur
ther bleeding now that the anticoagulation is held.  She really did not have significant diarrhea las
t night.

3.  Pulmonary embolism.  She has had a filter placed.  I would still think that anticoagulation could
 be retried with a shorter-acting agent with Lovenox with close monitoring of her hemoglobin.  She is
 expected to have some oozing from the diffuse colitis, but as the colitis is treated with steroid, t
hen the bleeding should improve.

4.  The family has decided to make her comfort care and has declined all treatment including anticoag
ulation and steroids.  I did express that I did not believe her current condition is terminal and gabriela
t this is a treatable condition.

 

The family is discussing what they wish to do for treatment from this point.  Right now, they just wa
nt scheduled morphine.

 

RECOMMENDATIONS:  My recommendation at this point would be to restart the steroids and potentially gi
ve another trial with the anticoagulation today or tomorrow.  We could try oral steroids and oral aza
thioprine.  She also can be continued ideally on the mesalamine.  We will await further decision from
 the family on treatment plan.  They are also considering discussion with palliative care at this poi
nt.

## 2018-07-07 NOTE — PRG
DATE OF SERVICE:  07/07/2018

 

Ms. Garcia has apparently been converted to a comfort care status.

 

Family has relayed to the nurses that a large number of people checking on her.  They just want her k
ept comfortable.

 

She has inferior cava filter in place.  I will be very hesitant to restart anticoagulation in this se
tting, especially with her desire for comfort measures will be available if needed.  She could be tra
nsferred out of the intermediate care unit.

## 2018-07-07 NOTE — PDOC.EVN
Event Note





- Event Note


Event Note: 





I was amde aware that family has decided to make pt DNR/DNI and comfort care 

last night around 2.30 am after talking to on call MD





Talked to  first in waiting room who updated me that this was a family 

decision and Pt was not involved in decision making.Informed him that the Pt is 

AAOX3 and capable of making her own decisions and their decision is not legal.I 

made them aware that Pt has a right to know her diagnosis and 

prognosis.requested a family meeting before i see the patient


met with  and two daughters in CCU family conference room.Discussed case 

with BOONE Rich before family meeting who also expressed that pt is treatable.





All Qs answered.Family reports that they have heard pt moaning in sleep to stop 

all treatments.I requested that i have to discuss this with pt myself but they 

are hesitant for me to talk to their mom about "dying" .I discussed the 

expected disease trajectory and necessary treatments required in long term.I 

offered to consult Palliative care team for further discussion and they agreed.





PCT saw the pt and reported back to me that pt indeed wishes to stop all 

treatments and diagnostic work up and wants to be "left alone".family now 

report that they have OOH DNR for pt and Living Will and will bring it to 

hospital tomorrow.Pt still AOX3 and wants to be made DNR/DNI and comfort care 

only and wants to be comfortable.





Will consult Hospice.Will likely need Home with hospice as she is not terminal 

but does remain high risk for decompensation due to massive PE and severe UC 

with high potential for bleed. 





appreciate PCT help with difficult situation.
































Time spent in various meetings and discussion in Advance Care Planning -40 

minutes.

## 2018-07-07 NOTE — PDOC.PN
- Subjective


Encounter Start Date: 07/07/18


Encounter Start Time: 14:19


Subjective: no Bm since last night.had increased LLQ jann last night needing 

morphine


-: no pain this am. family reports poor PO intake





- Objective


Resuscitation Status: 


 











Resuscitation Status           DNR:Do Not Resuscitate














MAR Reviewed: Yes


Vital Signs & Weight: 


 Vital Signs (12 hours)











  Temp Pulse Resp


 


 07/07/18 09:48   115 H 


 


 07/07/18 08:00  97.9 F  115 H  18








 Weight











Weight                         186 lb 2 oz














I&O: 


 











 07/06/18 07/07/18 07/08/18





 06:59 06:59 06:59


 


Intake Total 840 240 


 


Output Total 450 250 


 


Balance 390 -10 











Result Diagrams: 


 07/06/18 11:50





 07/06/18 05:45


Additional Labs: 





Microbiology





07/05/18 22:10   Stool   C. difficile GDH Antigen & Toxins - Final


06/29/18 13:58   Venous blood - Right Hand   Blood Culture - Final


                              NO GROWTH IN 5 DAYS


06/29/18 13:48   Urine holm catheter   Urine Culture - Final


                              NO GROWTH AT 48 HOURS


06/29/18 13:44   Venous blood - Left Hand   Blood Culture - Final


                              NO GROWTH IN 5 DAYS





labs reviewed





Phys Exam





- Physical Examination


Constitutional: NAD


HEENT: PERRLA, moist MMs, sclera anicteric, oral pharynx no lesions


Neck: no nodes, no JVD, supple, full ROM


Respiratory: no wheezing, no rales, no rhonchi, clear to auscultation bilateral


Cardiovascular: RRR, no significant murmur


Gastrointestinal: soft, non-tender, positive bowel sounds


mildly distened


Musculoskeletal: pulses present, edema present


Neurological: non-focal, normal sensation, moves all 4 limbs


Psychiatric: normal affect, A&O x 3


Skin: no rash





Dx/Plan


(1) GI bleed


Code(s): K92.2 - GASTROINTESTINAL HEMORRHAGE, UNSPECIFIED   Status: Acute   





(2) Bilateral pulmonary embolism


Code(s): I26.99 - OTHER PULMONARY EMBOLISM WITHOUT ACUTE COR PULMONALE   Status

: Acute   Comment: Anticoagulation restarted 6/4/18.Now w increased 

hematochezia and drop in Hb. Eliquis stopped   





(3) Left leg DVT


Code(s): I82.402 - ACUTE EMBOLISM AND THOMBOS UNSP DEEP VEINS OF L LOW EXTREM   

Status: Acute   


Qualifiers: 


   Chronicity: acute 





(4) Atrial fibrillation with RVR


Code(s): I48.91 - UNSPECIFIED ATRIAL FIBRILLATION   Status: Acute   Comment: 

rate controlled on Amiodarone an ddigoxin.NSR now   





(5) Troponin level elevated


Code(s): R74.8 - ABNORMAL LEVELS OF OTHER SERUM ENZYMES   Status: Acute   

Comment: Likley demand ischemia from PE and a-fib w RVR. troponin trending down

   





(6) Acute diastolic CHF (congestive heart failure)


Code(s): I50.31 - ACUTE DIASTOLIC (CONGESTIVE) HEART FAILURE   Status: Resolved

   Comment: BNP>1000   





(7) Ulcerative colitis, acute


Code(s): K51.90 - ULCERATIVE COLITIS, UNSPECIFIED, WITHOUT COMPLICATIONS   

Status: Acute   Comment: On  Pentasa. Steroids stopped due to question of 

infection,now restarted as infection is ruled out.   





(8) HLD (hyperlipidemia)


Code(s): E78.5 - HYPERLIPIDEMIA, UNSPECIFIED   Status: Chronic   





(9) Physical deconditioning


Code(s): R53.81 - OTHER MALAISE   Status: Chronic   





- Plan


continue antibiotics, PT/OT, respiratory therapy, incentive spirometry, out of 

bed/ambulate, DVT proph w/SCDs


Pt encouraged to ambulate and increase PO intake


-: discussed with Dr. diaz. would re start Anticoagulation


-: cont IV sterods


-: H/h monitoring.rehab eval





* .








Review of Systems





- Review of Systems


Constitutional: weakness, malaise.  negative: fever, chills, sweats, other


ENT: negative: Ear Pain, Ear Discharge, Nose Pain, Nose Discharge, Nose 

Congestion, Mouth Pain, Mouth Swelling, Throat Pain, Throat Swelling, Other


Respiratory: negative: Cough, Dry, Shortness of Breath, Hemoptysis, SOB with 

Excertion, Pleuritic Pain, Sputum, Wheezing


Cardiovascular: negative: chest pain, palpitations, orthopnea, paroxysmal 

nocturnal dyspnea, edema, light headedness, other


Gastrointestinal: negative: Nausea, Vomiting, Abdominal Pain, Diarrhea, 

Constipation, Melena, Hematochezia, Other


Genitourinary: negative: Dysuria, Frequency, Incontinence, Hematuria, Retention

, Other


Musculoskeletal: negative: Neck Pain, Shoulder Pain, Arm Pain, Back Pain, Hand 

Pain, Leg Pain, Foot Pain, Other


Neurological: negative: Weakness, Numbness, Incoordination, Change in Speech, 

Confusion, Seizures, Other





- Medications/Allergies


Allergies/Adverse Reactions: 


 Allergies











Allergy/AdvReac Type Severity Reaction Status Date / Time


 


clindamycin [From Cleocin] Allergy   Verified 06/18/18 13:44


 


levofloxacin Allergy   Verified 06/18/18 13:44


 


Penicillins Allergy   Verified 06/18/18 13:44


 


polyethylene glycol Allergy   Verified 06/18/18 13:44





[From Colyte]     


 


polyethylene glycol 3350 Allergy   Verified 06/18/18 13:44





[From Colyte]     


 


potassium chloride Allergy   Verified 06/18/18 13:44





[From Colyte]     


 


sodium [From Colyte] Allergy   Verified 06/18/18 13:44


 


sodium bicarbonate Allergy   Verified 06/18/18 13:44





[From Colyte]     


 


sodium chloride [From Colyte] Allergy   Verified 06/18/18 13:44


 


sodium sulfate [From Colyte] Allergy   Verified 06/18/18 13:44


 


Sulfa (Sulfonamide Allergy   Verified 06/18/18 13:44





Antibiotics)     











Medications: 


 Current Medications





Acetaminophen (Tylenol)  650 mg PO Q4H PRN


   PRN Reason: Headache/Fever or Pain


   Last Admin: 07/05/18 09:57 Dose:  650 mg


Hydrocodone Bitart/Acetaminophen (Norco 5/325)  1 tab PO Q4H PRN


   PRN Reason: Moderate Pain (4-6)


   Last Admin: 07/06/18 14:22 Dose:  1 tab


Al Hydroxide/Mg Hydroxide (Maalox)  30 ml PO Q6H PRN


   PRN Reason: Heartburn  or Indigestion


Artificial Tears (Tears Naturale)  0 drop EA EYE PRN PRN


   PRN Reason: Dry Eyes


Cyanocobalamin (Vitamin B-12)  1,000 mcg PO DAILY [facility]


   Last Admin: 07/07/18 09:48 Dose:  Not Given


Digoxin (Lanoxin)  0.125 mg PO DAILY [facility]


   Last Admin: 07/07/18 09:48 Dose:  Not Given


Dronedarone (Multaq)  400 mg PO BID-[facility]


   Last Admin: 07/07/18 09:47 Dose:  Not Given


Folic Acid (Folvite)  1 mg PO DAILY [facility]


   Last Admin: 07/07/18 09:48 Dose:  Not Given


Guaifenesin (Robitussin Sf)  200 mg PO Q4H PRN


   PRN Reason: Cough


Loperamide HCl (Imodium)  2 mg PO PRN PRN


   PRN Reason: Diarrhea/Loose Stools


   Last Admin: 07/06/18 09:24 Dose:  2 mg


Loratadine (Claritin)  10 mg PO DAILYPRN PRN


   PRN Reason: Sinus Symptoms


Magnesium Hydroxide (Milk Of Magnesium)  30 ml PO DAILYPRN PRN


   PRN Reason: Constipation


Melatonin (Melatonin)  9 mg PO HS [facility]


   Last Admin: 07/07/18 00:09 Dose:  Not Given


Mesalamine (Pentasa)  2,000 mg PO BID [facility]


   Last Admin: 07/07/18 09:48 Dose:  Not Given


Methylprednisolone Sodium Succinate (Solu-Medrol)  20 mg IVP Q8HR [facility]


   Last Admin: 07/07/18 09:47 Dose:  Not Given


Metronidazole (Flagyl)  500 mg PO TID [facility]


   Last Admin: 07/07/18 09:48 Dose:  Not Given


Mineral Oil/White Petrolatum (Eucerin Cream)  0 gm TOP BIDPRN PRN


   PRN Reason: Dry Skin


Morphine Sulfate (Morphine)  2 mg SLOW IVP Q2H PRN


   PRN Reason: Severe Pain (7-10)


   Last Admin: 07/07/18 08:40 Dose:  2 mg


Ondansetron HCl (Zofran Odt)  4 mg PO Q6H PRN


   PRN Reason: Nausea/Vomiting


Ondansetron HCl (Zofran)  4 mg IVP Q6H PRN


   PRN Reason: Nausea/Vomiting


   Last Admin: 07/04/18 11:32 Dose:  4 mg


Pantoprazole Sodium (Protonix)  40 mg IVP Q12HR [facility]


   Last Admin: 07/07/18 09:49 Dose:  Not Given


Phenol (Chloraseptic Spray 180 Ml Bot)  0 ml PO PRN PRN


   PRN Reason: Sore Throat


Senna (Senokot)  2 tab PO HSPRN PRN


   PRN Reason: Constipation


Sodium Chloride (Ocean Nasal Spray 0.65%)  0 ml EA NARE QIDPRN PRN


   PRN Reason: Nasal Congestion


Sodium Chloride (Flush - Normal Saline)  10 ml IVF Q12HR [facility]


   Last Admin: 07/07/18 09:49 Dose:  Not Given


Sodium Chloride (Flush - Normal Saline)  10 ml IVF PRN PRN


   PRN Reason: Saline Flush


Zolpidem Tartrate (Ambien)  5 mg PO HSPRN PRN


   PRN Reason: Insomnia

## 2018-07-07 NOTE — EKG
Test Reason : 

Blood Pressure : ***/*** mmHG

Vent. Rate : 199 BPM     Atrial Rate : 147 BPM

   P-R Int : 000 ms          QRS Dur : 076 ms

    QT Int : 236 ms       P-R-T Axes : 000 010 159 degrees

   QTc Int : 429 ms

 

Supraventricular tachycardia

Nonspecific T wave abnormality

Abnormal ECG

 

Confirmed by RUBINA ROWLEY MD ([id number]),  MARINE GREEN ([id number]) on 7/7/2018 12:24:58 PM

 

Referred By:             Confirmed By:RUBINA ROWLEY MD

## 2018-07-08 VITALS — DIASTOLIC BLOOD PRESSURE: 88 MMHG | TEMPERATURE: 97 F | SYSTOLIC BLOOD PRESSURE: 170 MMHG

## 2018-07-08 RX ADMIN — Medication SCH: at 09:49

## 2018-07-08 RX ADMIN — CYANOCOBALAMIN TAB 1000 MCG SCH: 1000 TAB at 09:11

## 2018-07-08 NOTE — DS
DATE OF ADMISSION:  2018

 

DATE OF EXPIRATION:  2018

 

FINAL DIAGNOSES:

1.  Acute gastrointestinal bleeding, multifactorial.

2.  Bilateral pulmonary embolus.

3.  Left lower extremity deep venous thrombosis.

4.  Atrial fibrillation with rapid ventricular response.

5.  Acute ulcerative colitis with hematochezia.

6.  Acute diastolic congestive heart failure with ejection fraction of 50%-55%.

7.  Severe deconditioning.

8.  Status post inferior vena cava filter placement.

 

CONSULTATIONS:  Dr. Rudolph and Dr. Velásquez with Pulmonology Service.  Dr. Rich with GI service.  Dr. Martinez with Vascular Surgery Service.

 

PERTINENT LABORATORY AND X-RAY FINDINGS:  Lactic acid level ranged between 3.0-4.1, phosphorus 3.5, m
agnesium 1.9.  Troponin I ranged between 0.176-0327.  BNP ranged between 828-1110, TSH 0.76.  CBC jose rafael
wed a white blood cell count ranged between 15.6-29.1, hemoglobin ranged between 8.2-15.3.  Blood cul
tures x2 from 2018 showed no growth at 5 days.  Urine culture dated 2018 showed no growth
 at 48 hours.  C. difficile antigen toxin dated 2018 negative.  Portable chest x-ray dated  showed no acute cardiopulmonary process.  CT angiogram of the chest dated 2018 showed bi
lateral pulmonary emboli with large saddle type embolus in the left main pulmonary artery.  Bilateral
 lower extremity venous Doppler study dated 2018 showed thrombus in the proximal left lower ext
remity femoral vein.  A 2D transthoracic echocardiogram dated 2018 showed ejection fraction of 
50%-55%.  Diastolic dysfunction noted.  Mild left atrial enlargement.  Mild mitral and tricuspid valv
e regurgitation.  Abdominal radiograph dated 2018 showed single dilated loop of left lower quad
rant bowel involving transverse colon.

 

HOSPITAL COURSE:  The patient initially presented with chest pain, shortness of breath and hypotensio
n, presenting with initial blood pressure 80/40.  The patient was initially managed for septic shock 
due to leukocytosis and placed on broad spectrum IV antibiotic therapy.  The patient was initially ma
naged with Levophed for pressor support; however, this was discontinued after tachycardia was noted. 
 The patient underwent CT angiogram of the chest showing large saddle embolus and pulmonary emboli an
d placed on anticoagulation.  The patient was placed on heparin infusion and managed in the critical 
care unit.  The patient also underwent venogram analysis of the lower extremity showing a left lower 
extremity DVT.  The patient stabilized on a heparin infusion; however, developed lower GI bleed in th
e context of ulcerative colitis.  Due to patient's GI bleed and contraindication for further anticoag
ulation, patient underwent evaluation by the Vascular Surgery Service, undergoing an eventual IVC hiram
ter placement.  The patient did receive treatment for the ulcerative colitis after evaluation by the 
GI service.  The patient received IV methylprednisolone; however, continued to clinically decompensat
e.  Due to patient's overall comorbid status and severe deconditioning, discussions were had with the
 family regarding ongoing aggressive interventions versus palliative care therapy.  The patient decid
ed to pursue comfort and palliative measures after discussing with her family members.  Palliative Ca
re Service was involved with assistance and support and confirm patient's wishes to proceed with this
 course.  The patient apparently decompensated in the evening on 2018 as well as early a.m. 2018 with severe bradycardia.  The patient was discovered pulseless without spontaneous respiratio
ns and  at 9:26 a.m. on 2018.  Decedent affairs were notified and family were present at
 the bedside.